# Patient Record
Sex: FEMALE | Race: WHITE | NOT HISPANIC OR LATINO | Employment: UNEMPLOYED | URBAN - NONMETROPOLITAN AREA
[De-identification: names, ages, dates, MRNs, and addresses within clinical notes are randomized per-mention and may not be internally consistent; named-entity substitution may affect disease eponyms.]

---

## 2022-03-08 PROCEDURE — 80048 BASIC METABOLIC PNL TOTAL CA: CPT | Performed by: NURSE PRACTITIONER

## 2022-03-08 PROCEDURE — 87086 URINE CULTURE/COLONY COUNT: CPT | Performed by: NURSE PRACTITIONER

## 2022-03-16 ENCOUNTER — OFFICE VISIT (OUTPATIENT)
Dept: FAMILY MEDICINE CLINIC | Facility: CLINIC | Age: 32
End: 2022-03-16

## 2022-03-16 VITALS
TEMPERATURE: 97.2 F | BODY MASS INDEX: 29.59 KG/M2 | HEIGHT: 62 IN | OXYGEN SATURATION: 99 % | WEIGHT: 160.8 LBS | HEART RATE: 88 BPM | SYSTOLIC BLOOD PRESSURE: 140 MMHG | DIASTOLIC BLOOD PRESSURE: 80 MMHG

## 2022-03-16 DIAGNOSIS — R31.0 GROSS HEMATURIA: ICD-10-CM

## 2022-03-16 DIAGNOSIS — R10.9 ABDOMINAL PAIN, UNSPECIFIED ABDOMINAL LOCATION: ICD-10-CM

## 2022-03-16 DIAGNOSIS — Z87.442 HISTORY OF KIDNEY STONES: ICD-10-CM

## 2022-03-16 DIAGNOSIS — Z00.00 ANNUAL PHYSICAL EXAM: Primary | ICD-10-CM

## 2022-03-16 PROCEDURE — 99385 PREV VISIT NEW AGE 18-39: CPT | Performed by: FAMILY MEDICINE

## 2022-03-16 RX ORDER — IBUPROFEN 200 MG
200 TABLET ORAL EVERY 6 HOURS PRN
COMMUNITY
End: 2022-06-20

## 2022-03-17 ENCOUNTER — PATIENT ROUNDING (BHMG ONLY) (OUTPATIENT)
Dept: FAMILY MEDICINE CLINIC | Facility: CLINIC | Age: 32
End: 2022-03-17

## 2022-03-17 NOTE — PROGRESS NOTES
March 17, 2022    Hello, may I speak with Jessy Melchor?    My name is Cadence    I am  with [facility]  [address] [address] [phone].    Before we get started may I verify your date of birth? 1990    I am calling to officially welcome you to our practice and ask about your recent visit. Is this a good time to talk? yes    Tell me about your visit with us. What things went well?  provider was excellent and staff was great       We're always looking for ways to make our patients' experiences even better. Do you have recommendations on ways we may improve?  no    Overall were you satisfied with your first visit to our practice? yes       I appreciate you taking the time to speak with me today. Is there anything else I can do for you? no      Thank you, and have a great day.

## 2022-04-01 ENCOUNTER — OFFICE VISIT (OUTPATIENT)
Dept: OBSTETRICS AND GYNECOLOGY | Facility: CLINIC | Age: 32
End: 2022-04-01

## 2022-04-01 VITALS
BODY MASS INDEX: 29.63 KG/M2 | HEIGHT: 62 IN | DIASTOLIC BLOOD PRESSURE: 94 MMHG | SYSTOLIC BLOOD PRESSURE: 140 MMHG | WEIGHT: 161 LBS

## 2022-04-01 DIAGNOSIS — Z87.442 HISTORY OF KIDNEY STONES: ICD-10-CM

## 2022-04-01 DIAGNOSIS — Z01.419 WELL WOMAN EXAM WITH ROUTINE GYNECOLOGICAL EXAM: Primary | ICD-10-CM

## 2022-04-01 DIAGNOSIS — E53.8 VITAMIN B 12 DEFICIENCY: ICD-10-CM

## 2022-04-01 DIAGNOSIS — E55.9 VITAMIN D DEFICIENCY: ICD-10-CM

## 2022-04-01 PROCEDURE — 99395 PREV VISIT EST AGE 18-39: CPT | Performed by: NURSE PRACTITIONER

## 2022-04-01 PROCEDURE — G0123 SCREEN CERV/VAG THIN LAYER: HCPCS | Performed by: NURSE PRACTITIONER

## 2022-04-01 PROCEDURE — 87624 HPV HI-RISK TYP POOLED RSLT: CPT | Performed by: NURSE PRACTITIONER

## 2022-04-01 NOTE — PATIENT INSTRUCTIONS
Health Maintenance, Female  Adopting a healthy lifestyle and getting preventive care are important in promoting health and wellness. Ask your health care provider about:  The right schedule for you to have regular tests and exams.  Things you can do on your own to prevent diseases and keep yourself healthy.  What should I know about diet, weight, and exercise?  Eat a healthy diet    Eat a diet that includes plenty of vegetables, fruits, low-fat dairy products, and lean protein.  Do not eat a lot of foods that are high in solid fats, added sugars, or sodium.    Maintain a healthy weight  Body mass index (BMI) is used to identify weight problems. It estimates body fat based on height and weight. Your health care provider can help determine your BMI and help you achieve or maintain a healthy weight.  Get regular exercise  Get regular exercise. This is one of the most important things you can do for your health. Most adults should:  Exercise for at least 150 minutes each week. The exercise should increase your heart rate and make you sweat (moderate-intensity exercise).  Do strengthening exercises at least twice a week. This is in addition to the moderate-intensity exercise.  Spend less time sitting. Even light physical activity can be beneficial.  Start  Vitamin D 5000IU per day. Vitamin D has been shown to improve your mood, help with fatigue and increase your immune system. A normal Vitamin D in women should be 50-70.  You should have your Vitamin D checked yearly  Watch cholesterol and blood lipids  Have your blood tested for lipids and cholesterol at 20 years of age, then have this test every 3 years.  Have your cholesterol levels checked more often if:  Your lipid or cholesterol levels are high.  You are older than 30 years of age.  You are at high risk for heart disease.  What should I know about cancer screening?  Depending on your health history and family history, you may need to have cancer screening at  various ages. This may include screening for:  Breast cancer.  Cervical cancer.  Colorectal cancer.  Skin cancer.  Lung cancer.  What should I know about heart disease, diabetes, and high blood pressure?  Blood pressure and heart disease  High blood pressure causes heart disease and increases the risk of stroke. This is more likely to develop in people who have high blood pressure readings, are of  descent, or are overweight.  Have your blood pressure checked:                  Every year.  Diabetes  Have regular diabetes screenings. This checks your fasting blood sugar level. Have the screening done:  Once every year after age 30 if you are at a normal weight and have a low risk for diabetes.  More often and at a younger age if you are overweight or have a high risk for diabetes.  What should I know about preventing infection?  Hepatitis B  If you have a higher risk for hepatitis B, you should be screened for this virus. Talk with your health care provider to find out if you are at risk for hepatitis B infection.  Hepatitis C  Testing is recommended for:  Everyone born from 1945 through 1965.  Anyone with known risk factors for hepatitis C.  Sexually transmitted infections (STIs)  Get screened for STIs, including gonorrhea and chlamydia, if:  You are sexually active and are younger than 24 years of age.  You are older than 24 years of age and your health care provider tells you that you are at risk for this type of infection.  Your sexual activity has changed since you were last screened, and you are at increased risk for chlamydia or gonorrhea. Ask your health care provider if you are at risk.  Ask your health care provider about whether you are at high risk for HIV. Your health care provider may recommend a prescription medicine to help prevent HIV infection. If you choose to take medicine to prevent HIV, you should first get tested for HIV. You should then be tested every 3 months for as long as you are  taking the medicine.  Pregnancy  If you are about to stop having your period (premenopausal) and you may become pregnant, seek counseling before you get pregnant.  Take 400 to 800 micrograms (mcg) of folic acid every day if you become pregnant.  Ask for birth control (contraception) if you want to prevent pregnancy.  Osteoporosis and menopause  Osteoporosis is a disease in which the bones lose minerals and strength with aging. This can result in bone fractures. If you are 55 years old or older, or if you are at risk for osteoporosis and fractures, ask your health care provider if you should:  Be screened for bone loss.  Take a calcium or vitamin D supplement to lower your risk of fractures.  Be given hormone replacement therapy (HRT) to treat symptoms of menopause.  Follow these instructions at home:  Lifestyle  Do not use any products that contain nicotine or tobacco, such as cigarettes, e-cigarettes, and chewing tobacco. If you need help quitting, ask your health care provider.  Do not use street drugs.  Do not share needles.  Ask your health care provider for help if you need support or information about quitting drugs.  Alcohol use  Do not drink alcohol if:  Your health care provider tells you not to drink.  You are pregnant, may be pregnant, or are planning to become pregnant.  If you drink alcohol:  Limit how much you use to 0-1 drink a day.  Limit intake if you are breastfeeding.  Be aware of how much alcohol is in your drink. In the [place] one drink equals one 12 oz bottle of beer (355 mL), one 5 oz glass of wine (148 mL), or one 1½ oz glass of hard liquor (44 mL).  General instructions  Schedule regular health, dental, and eye exams.  Stay current with your vaccines.  Tell your health care provider if:  You often feel depressed.  You have ever been abused or do not feel safe at home.  Summary  Adopting a healthy lifestyle and getting preventive care are important in promoting health and wellness.  Follow  your health care provider's instructions about healthy diet, exercising, and getting tested or screened for diseases.  Follow your health care provider's instructions on monitoring your cholesterol and blood pressure.  This information is not intended to replace advice given to you by your health care provider. Make sure you discuss any questions you have with your health care provider.  Document Revised: 12/11/2019 Document Reviewed: 12/11/2019  [employer] Patient Education © 2021 [employer] Kegel Exercises    Kegel exercises can help strengthen your pelvic floor muscles. The pelvic floor is a group of muscles that support your rectum, small intestine, and bladder. In females, pelvic floor muscles also help support the womb (uterus). These muscles help you control the flow of urine and stool.  Kegel exercises are painless and simple, and they do not require any equipment. Your provider may suggest Kegel exercises to:  Improve bladder and bowel control.  Improve sexual response.  Improve weak pelvic floor muscles after surgery to remove the uterus (hysterectomy) or pregnancy (females).  Improve weak pelvic floor muscles after prostate gland removal or surgery (males).  Kegel exercises involve squeezing your pelvic floor muscles, which are the same muscles you squeeze when you try to stop the flow of urine or keep from passing gas. The exercises can be done while sitting, standing, or lying down, but it is best to vary your position.  Exercises  How to do Kegel exercises:  Squeeze your pelvic floor muscles tight. You should feel a tight lift in your rectal area. If you are a female, you should also feel a tightness in your vaginal area. Keep your stomach, buttocks, and legs relaxed.  Hold the muscles tight for up to 10 seconds.  Breathe normally.  Relax your muscles.  Repeat as told by your health care provider.  Repeat this exercise daily as told by your health care provider. Continue to do this exercise for at  least 4-6 weeks, or for as long as told by your health care provider.  You may be referred to a physical therapist who can help you learn more about how to do Kegel exercises.  Depending on your condition, your health care provider may recommend:  Varying how long you squeeze your muscles.  Doing several sets of exercises every day.  Doing exercises for several weeks.  Making Kegel exercises a part of your regular exercise routine.  This information is not intended to replace advice given to you by your health care provider. Make sure you discuss any questions you have with your health care provider.  Document Revised: 04/23/2021 Document Reviewed: 08/07/2019  [employer] Patient Education © 2021 [employer]

## 2022-04-01 NOTE — PROGRESS NOTES
"Subjective     Jessy Melchor is a 31 y.o. female    Patient is here today as a new patient for yearly checkup.  She is recently had a kidney stone and was able to pass it.  She has a follow-up appointment with urology.  She is still breast-feeding.    Gynecologic Exam  The patient's pertinent negatives include no pelvic pain, vaginal bleeding or vaginal discharge. The patient is experiencing no pain. Pertinent negatives include no abdominal pain, anorexia, back pain, chills, constipation, diarrhea, discolored urine, dysuria, fever, flank pain, frequency, headaches, hematuria, joint pain, joint swelling, nausea, painful intercourse, rash, sore throat, urgency or vomiting. She is sexually active. She uses the rhythm method for contraception. Her menstrual history has been regular.         /94   Ht 157.5 cm (62.01\")   Wt 73 kg (161 lb)   LMP 03/03/2022   BMI 29.44 kg/m²     Outpatient Encounter Medications as of 4/1/2022   Medication Sig Dispense Refill   • ibuprofen (ADVIL,MOTRIN) 200 MG tablet Take 200 mg by mouth Every 6 (Six) Hours As Needed for Mild Pain .     • Prenatal Vit-Fe Fumarate-FA (PRENATAL VITAMIN PO) Take  by mouth.       No facility-administered encounter medications on file as of 4/1/2022.       Surgical History  Past Surgical History:   Procedure Laterality Date   • WISDOM TOOTH EXTRACTION         Family History  Family History   Problem Relation Age of Onset   • Hypertension Father    • No Known Problems Mother    • No Known Problems Sister    • No Known Problems Sister    • Arthritis Paternal Grandfather    • Melanoma Paternal Grandmother 70   • Diabetes Maternal Aunt    • Breast cancer Neg Hx    • Ovarian cancer Neg Hx    • Uterine cancer Neg Hx    • Colon cancer Neg Hx    • Prostate cancer Neg Hx        The following portions of the patient's history were reviewed and updated as appropriate: allergies, current medications, past family history, past medical history, past social " history, past surgical history, and problem list.    Review of Systems   Constitutional: Negative for activity change, appetite change, chills, diaphoresis, fatigue, fever, unexpected weight gain and unexpected weight loss.   HENT: Negative for congestion, dental problem, drooling, ear discharge, ear pain, facial swelling, hearing loss, mouth sores, nosebleeds, postnasal drip, rhinorrhea, sinus pressure, sneezing, sore throat, swollen glands, tinnitus, trouble swallowing and voice change.    Eyes: Negative for blurred vision, double vision, photophobia, pain, discharge, redness, itching and visual disturbance.   Respiratory: Negative for apnea, cough, choking, chest tightness, shortness of breath, wheezing and stridor.    Cardiovascular: Negative for chest pain, palpitations and leg swelling.   Gastrointestinal: Negative for abdominal distention, abdominal pain, anal bleeding, anorexia, blood in stool, constipation, diarrhea, nausea, rectal pain, vomiting, GERD and indigestion.   Endocrine: Negative for cold intolerance, heat intolerance, polydipsia, polyphagia and polyuria.   Genitourinary: Negative for amenorrhea, breast discharge, breast lump, breast pain, decreased libido, decreased urine volume, difficulty urinating, dyspareunia, dysuria, flank pain, frequency, genital sores, hematuria, menstrual problem, pelvic pain, pelvic pressure, urgency, urinary incontinence, vaginal bleeding, vaginal discharge and vaginal pain.   Musculoskeletal: Negative for arthralgias, back pain, gait problem, joint pain, joint swelling, myalgias, neck pain, neck stiffness and bursitis.   Skin: Negative for color change, dry skin and rash.   Allergic/Immunologic: Negative for environmental allergies, food allergies and immunocompromised state.   Neurological: Negative for dizziness, tremors, seizures, syncope, facial asymmetry, speech difficulty, weakness, light-headedness, numbness, headache, memory problem and confusion.    Hematological: Negative for adenopathy. Does not bruise/bleed easily.   Psychiatric/Behavioral: Negative for agitation, behavioral problems, decreased concentration, dysphoric mood, hallucinations, self-injury, sleep disturbance, suicidal ideas, negative for hyperactivity, depressed mood and stress. The patient is not nervous/anxious.        Objective   Physical Exam  Vitals and nursing note reviewed. Exam conducted with a chaperone present.   Constitutional:       General: She is not in acute distress.     Appearance: She is well-developed. She is not diaphoretic.   HENT:      Head: Normocephalic.      Right Ear: External ear normal.      Left Ear: External ear normal.      Nose: Nose normal.   Eyes:      General: No scleral icterus.        Right eye: No discharge.         Left eye: No discharge.      Conjunctiva/sclera: Conjunctivae normal.      Pupils: Pupils are equal, round, and reactive to light.   Neck:      Thyroid: No thyromegaly.      Vascular: No carotid bruit.      Trachea: No tracheal deviation.   Cardiovascular:      Rate and Rhythm: Normal rate and regular rhythm.      Heart sounds: Normal heart sounds. No murmur heard.  Pulmonary:      Effort: Pulmonary effort is normal. No respiratory distress.      Breath sounds: Normal breath sounds. No wheezing.   Chest:   Breasts: Breasts are symmetrical.      Right: Normal. No swelling, bleeding, inverted nipple, mass, nipple discharge, skin change, tenderness, axillary adenopathy or supraclavicular adenopathy.      Left: Normal. No swelling, bleeding, inverted nipple, mass, nipple discharge, skin change, tenderness, axillary adenopathy or supraclavicular adenopathy.       Abdominal:      General: There is no distension.      Palpations: Abdomen is soft. There is no mass.      Tenderness: There is no abdominal tenderness. There is no right CVA tenderness, left CVA tenderness or guarding.      Hernia: No hernia is present. There is no hernia in the left  inguinal area or right inguinal area.   Genitourinary:     General: Normal vulva.      Exam position: Lithotomy position.      Labia:         Right: No rash, tenderness, lesion or injury.         Left: No rash, tenderness, lesion or injury.       Vagina: Normal. No signs of injury and foreign body. No vaginal discharge, erythema, tenderness or bleeding.      Cervix: Normal.      Uterus: Normal. Not enlarged, not fixed and not tender.       Adnexa: Right adnexa normal and left adnexa normal.        Right: No mass, tenderness or fullness.          Left: No mass, tenderness or fullness.        Rectum: Normal. No mass.      Comments: Mild cystocele noted  BSU normal  Urethral meatus  Normal  Perineum  Normal  Musculoskeletal:         General: No tenderness. Normal range of motion.      Cervical back: Normal range of motion and neck supple.   Lymphadenopathy:      Head:      Right side of head: No submental, submandibular, tonsillar, preauricular, posterior auricular or occipital adenopathy.      Left side of head: No submental, submandibular, tonsillar, preauricular, posterior auricular or occipital adenopathy.      Cervical: No cervical adenopathy.      Right cervical: No superficial, deep or posterior cervical adenopathy.     Left cervical: No superficial, deep or posterior cervical adenopathy.      Upper Body:      Right upper body: No supraclavicular, axillary or pectoral adenopathy.      Left upper body: No supraclavicular, axillary or pectoral adenopathy.      Lower Body: No right inguinal adenopathy. No left inguinal adenopathy.   Skin:     General: Skin is warm and dry.      Findings: No bruising, erythema or rash.   Neurological:      Mental Status: She is alert and oriented to person, place, and time.      Coordination: Coordination normal.   Psychiatric:         Mood and Affect: Mood normal.         Behavior: Behavior normal.         Thought Content: Thought content normal.         Judgment: Judgment normal.          Assessment/Plan   Diagnoses and all orders for this visit:    1. Well woman exam with routine gynecological exam (Primary)  Normal GYN exam. Will have lab work. Encouraged SBE, pt is aware how to do self breast exam and the importance of same. Discussed weight management and importance of maintaining a healthy weight. Discussed Vitamin D intake and the importance of adequate vitamin D for both Bone Health and a healthy immune system.  Discussed Daily exercise and the importance of same, in regards to a healthy heart as well as helping to maintain her weight and improving her mental health.  BMI 29.4.  Pap smear is done per [facility]P guidelines.  -     Liquid-based Pap Smear, Screening  -     CBC & Differential  -     Comprehensive Metabolic Panel  -     Lipid Panel With LDL / HDL Ratio  -     TSH  -     T3, Uptake  -     T4, Free  -     Hemoglobin A1c  -     Urine Culture - , Urine, Clean Catch  -     UA / M With / Rflx Culture([facility] ONLY) - Urine, Clean Catch  -     Hepatitis C Antibody    2. Mother currently breast-feeding  Comments:  Patient's baby is 1-year-old.  She plans on nursing for another year.  She will continue her prenatal vitamins.    3. History of kidney stones  Comments:  Patient has history of kidney stones.  She has an appointment with urology for follow-up within the next month.    4. Vitamin D deficiency  Comments:  Patient will return for blood work.  Orders:  -     Vitamin D 25 Hydroxy    5. Vitamin B 12 deficiency  Comments:  Patient will return for blood work.    6.  Mild cystocele  Patient is encouraged to do Kegel exercises.    Patient's Body mass index is 29.44 kg/m². indicating that she is overweight (BMI 25-29.9). Patient's (Body mass index is 29.44 kg/m².) indicates that they are overweight with health conditions that include none . Weight is unchanged. BMI is is above average; BMI management plan is completed. We discussed portion control and increasing exercise. .      Ashley CUETO  Jillian, APRN  4/1/2022

## 2022-04-04 ENCOUNTER — HOSPITAL ENCOUNTER (OUTPATIENT)
Dept: GENERAL RADIOLOGY | Facility: HOSPITAL | Age: 32
Discharge: HOME | End: 2022-04-04
Admitting: FAMILY MEDICINE

## 2022-04-04 DIAGNOSIS — Z87.442 HISTORY OF KIDNEY STONES: ICD-10-CM

## 2022-04-04 DIAGNOSIS — R10.9 ABDOMINAL PAIN, UNSPECIFIED ABDOMINAL LOCATION: ICD-10-CM

## 2022-04-04 DIAGNOSIS — R31.0 GROSS HEMATURIA: ICD-10-CM

## 2022-04-04 PROCEDURE — 74018 RADEX ABDOMEN 1 VIEW: CPT

## 2022-04-05 LAB
GEN CATEG CVX/VAG CYTO-IMP: NORMAL
HPV I/H RISK 4 DNA CVX QL PROBE+SIG AMP: NOT DETECTED
LAB AP CASE REPORT: NORMAL
LAB AP GYN ADDITIONAL INFORMATION: NORMAL
LAB AP GYN OTHER FINDINGS: NORMAL
PATH INTERP SPEC-IMP: NORMAL
STAT OF ADQ CVX/VAG CYTO-IMP: NORMAL

## 2022-04-05 NOTE — PROGRESS NOTES
Subjective    Ms. Melchor is 31 y.o. female    Chief Complaint: Gross Hematuria    History of Present Illness  31-year-old female new patient with history of kidney stones reports intermittent flank pain, most recently on Monday she had severe sudden onset right-sided flank pain just like her previous stone pain.  She has also had intermittent gross hematuria for several weeks.  She reports her last kidney stone was around 2014 in [place].  KUB x-ray done today reviewed by me with the patient shows 3 to 4 mm left lower pole kidney stone, possible calcification along distal aspect of right mid ureter overlying the sacrum consistent with possible right ureteral stone, no discrete right kidney stone visible.  UA with microhematuria today.  She denies fevers, dysuria.    I independently visualized and reviewed the patient's prior imaging studies today in clinic and discussed the imaging findings with the patient.      The following portions of the patient's history were reviewed and updated as appropriate: allergies, current medications, past family history, past medical history, past social history, past surgical history and problem list.    Review of Systems      Current Outpatient Medications:   •  ibuprofen (ADVIL,MOTRIN) 200 MG tablet, Take 200 mg by mouth Every 6 (Six) Hours As Needed for Mild Pain ., Disp: , Rfl:   •  Prenatal Vit-Fe Fumarate-FA (PRENATAL VITAMIN PO), Take  by mouth., Disp: , Rfl:   •  ondansetron ODT (Zofran ODT) 4 MG disintegrating tablet, Place 1 tablet on the tongue Every 6 (Six) Hours As Needed for Nausea or Vomiting., Disp: 10 tablet, Rfl: 1    Past Medical History:   Diagnosis Date   • Kidney stone        Past Surgical History:   Procedure Laterality Date   • WISDOM TOOTH EXTRACTION         Social History     Socioeconomic History   • Marital status:    Tobacco Use   • Smoking status: Never Smoker   • Smokeless tobacco: Never Used   Vaping Use   • Vaping Use: Never used   Substance  "and Sexual Activity   • Alcohol use: Yes     Comment: occ   • Drug use: Never   • Sexual activity: Yes     Partners: Male     Birth control/protection: None       Family History   Problem Relation Age of Onset   • Hypertension Father    • No Known Problems Mother    • No Known Problems Sister    • No Known Problems Sister    • Arthritis Paternal Grandfather    • Melanoma Paternal Grandmother 70   • Diabetes Maternal Aunt    • Breast cancer Neg Hx    • Ovarian cancer Neg Hx    • Uterine cancer Neg Hx    • Colon cancer Neg Hx    • Prostate cancer Neg Hx        Objective    Temp 97 °F (36.1 °C)   Ht 157.5 cm (62\")   Wt 72.6 kg (160 lb)   BMI 29.26 kg/m²     Physical Exam        Results for orders placed or performed in visit on 04/06/22   POC Urinalysis Dipstick, Multipro    Specimen: Urine   Result Value Ref Range    Color Yellow Yellow, Straw, Dark Yellow, Sydnee    Clarity, UA Clear Clear    Glucose, UA Negative Negative, 1000 mg/dL (3+) mg/dL    Bilirubin Negative Negative    Ketones, UA Negative Negative    Specific Gravity  1.020 1.005 - 1.030    Blood, UA Large (A) Negative    pH, Urine 6.5 5.0 - 8.0    Protein, POC Negative Negative mg/dL    Urobilinogen, UA Normal Normal    Nitrite, UA Negative Negative    Leukocytes Negative Negative     Assessment and Plan    Diagnoses and all orders for this visit:    1. Gross hematuria (Primary)  -     POC Urinalysis Dipstick, Multipro  -     CT Abdomen Pelvis With & Without Contrast; Future    2. Kidney stone  -     ondansetron ODT (Zofran ODT) 4 MG disintegrating tablet; Place 1 tablet on the tongue Every 6 (Six) Hours As Needed for Nausea or Vomiting.  Dispense: 10 tablet; Refill: 1      Intermittent gross hematuria with new right-sided flank pain with history of kidney stones for which I recommended getting a CT urogram for complete upper tract evaluation for kidney stones, hydronephrosis, or renal mass.  She will follow-up with me after getting a CT urogram to " review her imaging.  I instructed her to go to the ER for any fevers and/or severe kidney stone pain sooner as needed.  She will use Zofran for her intermittent nausea.        This document has been signed by MERRY Arellano MD on April 7, 2022 11:44 CDT

## 2022-04-06 ENCOUNTER — OFFICE VISIT (OUTPATIENT)
Dept: UROLOGY | Facility: CLINIC | Age: 32
End: 2022-04-06

## 2022-04-06 VITALS — HEIGHT: 62 IN | TEMPERATURE: 97 F | BODY MASS INDEX: 29.44 KG/M2 | WEIGHT: 160 LBS

## 2022-04-06 DIAGNOSIS — R31.0 GROSS HEMATURIA: Primary | ICD-10-CM

## 2022-04-06 DIAGNOSIS — N20.0 KIDNEY STONE: ICD-10-CM

## 2022-04-06 LAB
BILIRUB BLD-MCNC: NEGATIVE MG/DL
CLARITY, POC: CLEAR
COLOR UR: YELLOW
GLUCOSE UR STRIP-MCNC: NEGATIVE MG/DL
KETONES UR QL: NEGATIVE
LEUKOCYTE EST, POC: NEGATIVE
NITRITE UR-MCNC: NEGATIVE MG/ML
PH UR: 6.5 [PH] (ref 5–8)
PROT UR STRIP-MCNC: NEGATIVE MG/DL
RBC # UR STRIP: ABNORMAL /UL
SP GR UR: 1.02 (ref 1–1.03)
UROBILINOGEN UR QL: NORMAL

## 2022-04-06 PROCEDURE — 81001 URINALYSIS AUTO W/SCOPE: CPT | Performed by: UROLOGY

## 2022-04-06 PROCEDURE — 99204 OFFICE O/P NEW MOD 45 MIN: CPT | Performed by: UROLOGY

## 2022-04-06 RX ORDER — ONDANSETRON 4 MG/1
4 TABLET, ORALLY DISINTEGRATING ORAL EVERY 6 HOURS PRN
Qty: 10 TABLET | Refills: 1 | Status: SHIPPED | OUTPATIENT
Start: 2022-04-06 | End: 2022-06-20

## 2022-04-06 NOTE — PROGRESS NOTES
Subjective    Ms. Melchor is 31 y.o. female    Chief Complaint: Gross Hematuria    History of Present Illness  31-year-old female follow-up to review her CT scan done for history of gross hematuria and right flank pain.  Her CT scan today was done without contrast due to breast-feeding and I reviewed her scan independently and with her today showing a 5 mm right distal ureteral stone along with an adjacent 2 to 3 mm stone with mild hydroureteronephrosis and punctate nephrocalcinosis bilaterally.    I independently visualized and reviewed the patient's prior imaging studies today in clinic and discussed the imaging findings with the patient.      The following portions of the patient's history were reviewed and updated as appropriate: allergies, current medications, past family history, past medical history, past social history, past surgical history and problem list.    Review of Systems      Current Outpatient Medications:   •  ibuprofen (ADVIL,MOTRIN) 200 MG tablet, Take 200 mg by mouth Every 6 (Six) Hours As Needed for Mild Pain ., Disp: , Rfl:   •  ondansetron ODT (Zofran ODT) 4 MG disintegrating tablet, Place 1 tablet on the tongue Every 6 (Six) Hours As Needed for Nausea or Vomiting., Disp: 10 tablet, Rfl: 1  •  Prenatal Vit-Fe Fumarate-FA (PRENATAL VITAMIN PO), Take  by mouth., Disp: , Rfl:     Past Medical History:   Diagnosis Date   • Kidney stone        Past Surgical History:   Procedure Laterality Date   • WISDOM TOOTH EXTRACTION         Social History     Socioeconomic History   • Marital status:    Tobacco Use   • Smoking status: Never Smoker   • Smokeless tobacco: Never Used   Vaping Use   • Vaping Use: Never used   Substance and Sexual Activity   • Alcohol use: Yes     Comment: occ   • Drug use: Never   • Sexual activity: Yes     Partners: Male     Birth control/protection: None       Family History   Problem Relation Age of Onset   • Hypertension Father    • No Known Problems Mother    • No  "Known Problems Sister    • No Known Problems Sister    • Arthritis Paternal Grandfather    • Melanoma Paternal Grandmother 70   • Diabetes Maternal Aunt    • Breast cancer Neg Hx    • Ovarian cancer Neg Hx    • Uterine cancer Neg Hx    • Colon cancer Neg Hx    • Prostate cancer Neg Hx        Objective    Temp 97.8 °F (36.6 °C)   Ht 157.5 cm (62\")   Wt 72.6 kg (160 lb)   BMI 29.26 kg/m²     Physical Exam        Results for orders placed or performed in visit on 04/12/22   POC Urinalysis Dipstick, Multipro    Specimen: Urine   Result Value Ref Range    Color Yellow Yellow, Straw, Dark Yellow, Sydnee    Clarity, UA Clear Clear    Glucose, UA Negative Negative, 1000 mg/dL (3+) mg/dL    Bilirubin Negative Negative    Ketones, UA Negative Negative    Specific Gravity  1.015 1.005 - 1.030    Blood, UA Negative Negative    pH, Urine 7.0 5.0 - 8.0    Protein, POC Negative Negative mg/dL    Urobilinogen, UA Normal Normal    Nitrite, UA Negative Negative    Leukocytes Negative Negative     Assessment and Plan    Diagnoses and all orders for this visit:    1. Right ureteral stone (Primary)  -     POC Urinalysis Dipstick, Multipro  -     Case Request; Standing  -     COVID PRE-OP / PRE-PROCEDURE SCREENING ORDER (NO ISOLATION) - Swab, Nasopharynx; Future  -     CBC (No Diff); Future  -     Basic Metabolic Panel; Future  -     Case Request    Other orders  -     Follow Anesthesia Guidelines / Standing Orders; Future  -     Obtain Informed Consent  -     Provide NPO Instructions to Patient; Future  -     Chlorhexidine Skin Prep; Future        Symptomatic 5 to 7 mm right distal ureteral stone.  We discussed management options including medical expulsive therapy, shockwave lithotripsy, or right ureteroscopy laser lithotripsy stent placement.  Given its position in the pelvis and difficulty visualizing on KUB x-ray last visit along with her young age, I did not recommend shock with lithotripsy.  I recommended scheduling right " ureteroscopy for laser lithotripsy and stent placement.  We discussed risks of the procedure including but not limited to infection, bleeding, need for additional procedures, injury to the ureter and/or kidney, inability to remove any/all stone, need for ureteral stent, complications of anesthesia.  She voices understanding and provide informed said to proceed with right ureteroscopy laser lithotripsy and stent placement on 4/22/2022.        This document has been signed by MERRY Arellano MD on April 12, 2022 13:00 CDT

## 2022-04-12 ENCOUNTER — OFFICE VISIT (OUTPATIENT)
Dept: UROLOGY | Facility: CLINIC | Age: 32
End: 2022-04-12

## 2022-04-12 ENCOUNTER — HOSPITAL ENCOUNTER (OUTPATIENT)
Dept: CT IMAGING | Facility: HOSPITAL | Age: 32
Discharge: HOME | End: 2022-04-12
Admitting: UROLOGY

## 2022-04-12 VITALS — HEIGHT: 62 IN | BODY MASS INDEX: 29.44 KG/M2 | TEMPERATURE: 97.8 F | WEIGHT: 160 LBS

## 2022-04-12 DIAGNOSIS — N20.1 RIGHT URETERAL STONE: Primary | ICD-10-CM

## 2022-04-12 DIAGNOSIS — R31.0 GROSS HEMATURIA: ICD-10-CM

## 2022-04-12 LAB
BILIRUB BLD-MCNC: NEGATIVE MG/DL
CLARITY, POC: CLEAR
COLOR UR: YELLOW
GLUCOSE UR STRIP-MCNC: NEGATIVE MG/DL
KETONES UR QL: NEGATIVE
LEUKOCYTE EST, POC: NEGATIVE
NITRITE UR-MCNC: NEGATIVE MG/ML
PH UR: 7 [PH] (ref 5–8)
PROT UR STRIP-MCNC: NEGATIVE MG/DL
RBC # UR STRIP: NEGATIVE /UL
SP GR UR: 1.01 (ref 1–1.03)
UROBILINOGEN UR QL: NORMAL

## 2022-04-12 PROCEDURE — 99213 OFFICE O/P EST LOW 20 MIN: CPT | Performed by: UROLOGY

## 2022-04-12 PROCEDURE — 81001 URINALYSIS AUTO W/SCOPE: CPT | Performed by: UROLOGY

## 2022-04-12 PROCEDURE — 74176 CT ABD & PELVIS W/O CONTRAST: CPT

## 2022-04-12 RX ORDER — SODIUM CHLORIDE, SODIUM LACTATE, POTASSIUM CHLORIDE, CALCIUM CHLORIDE 600; 310; 30; 20 MG/100ML; MG/100ML; MG/100ML; MG/100ML
100 INJECTION, SOLUTION INTRAVENOUS CONTINUOUS
Status: CANCELLED | OUTPATIENT
Start: 2022-04-12

## 2022-04-19 ENCOUNTER — LAB (OUTPATIENT)
Dept: LAB | Facility: HOSPITAL | Age: 32
End: 2022-04-19

## 2022-04-19 ENCOUNTER — PRE-ADMISSION TESTING (OUTPATIENT)
Dept: PREADMISSION TESTING | Facility: HOSPITAL | Age: 32
End: 2022-04-19

## 2022-04-19 VITALS
HEART RATE: 87 BPM | SYSTOLIC BLOOD PRESSURE: 137 MMHG | RESPIRATION RATE: 14 BRPM | DIASTOLIC BLOOD PRESSURE: 82 MMHG | WEIGHT: 160.27 LBS | HEIGHT: 63 IN | OXYGEN SATURATION: 100 % | BODY MASS INDEX: 28.4 KG/M2

## 2022-04-19 DIAGNOSIS — N20.1 RIGHT URETERAL STONE: ICD-10-CM

## 2022-04-19 LAB — SARS-COV-2 ORF1AB RESP QL NAA+PROBE: NOT DETECTED

## 2022-04-19 PROCEDURE — 85027 COMPLETE CBC AUTOMATED: CPT | Performed by: UROLOGY

## 2022-04-19 PROCEDURE — 80048 BASIC METABOLIC PNL TOTAL CA: CPT | Performed by: UROLOGY

## 2022-04-19 PROCEDURE — U0004 COV-19 TEST NON-CDC HGH THRU: HCPCS

## 2022-04-19 PROCEDURE — C9803 HOPD COVID-19 SPEC COLLECT: HCPCS

## 2022-04-19 NOTE — DISCHARGE INSTRUCTIONS
Before you come to the hospital        Arrival time: AS DIRECTED BY OFFICE     YOU MAY TAKE THE FOLLOWING MEDICATION(S) THE MORNING OF SURGERY WITH A SIP OF WATER: ***     ALL OTHER HOME MEDICATION CHECK WITH YOUR PHYSICIAN (especially if you are taking diabetes medicines or blood thinners)    Do not take any Erectile Dysfunction medications (EX: CIALIS, VIAGRA) 24 hours prior to surgery      If you were given and instructed to use a germ- killing soap, use as directed the night before surgery and the morning of surgery before coming to the hospital.       Eating and drinking restrictions  (It is extremely important that you follow these guidelines to prevent delay or cancelation of their procedure)   Up to 2 hours prior to the time you are told to arrive to the hospital - you may continue to drink clear liquids, such as water, clear fruit juice (no pulp), black coffee, and plain tea.          Eating and drinking restrictions prior to scheduled arrival time  Clear liquids (water, apple juice-no pulp)                       2 hours   Breast milk                           4 hours   Milk or drinks that contain milk, full liquids           6 hours   Light meals or foods, such as toast or cereal                6 hours   Heavy foods, such as meat, fried foods or fatty foods   8 hours       Clear Liquids  Water and flavored water                                                                       Sugar water.  Ice or frozen ice pops.  Clear Fruit juices, such as cranberry juice and apple juice.  Black coffee.  Plain tea  Clear bouillon or broth.  Broth-based soups that have been strained.  Flavored gelatin.  Soda.  Gatorade or Powerade.  Full liquid examples  Juices that have pulp.  Frozen ice pops that contain fruit pieces.  Coffee with creamer  Milk.  Cream or cream-based soups.  Yogurt.              MANAGING PAIN AFTER SURGERY    We know you are probably wondering what your pain will be like after surgery.  Following  surgery it is unrealistic to expect you will not have pain.   Pain is how our bodies let us know that something is wrong or cautions us to be careful.  That said, our goal is to make your pain tolerable.    Methods we may use to treat your pain include (oral or IV medications, PCAs, epidurals, nerve blocks, etc.)   While some procedures require IV pain medications for a short time after surgery, transitioning to pain medications by mouth allows for better management of pain.   Your nurse will encourage you to take oral pain medications whenever possible.  IV medications work almost immediately, but only last a short while.  Taking medications by mouth allows for a more constant level of medication in your blood stream for a longer period of time.      Once your pain is out of control it is harder to get back under control.  It is important you are aware when your next dose of pain medication is due.  If you are admitted, your nurse may write the time of your next dose on the white board in your room to help you remember.      We are interested in your pain and encourage you to inform us about aggravating factors during your visit.   Many times a simple repositioning every few hours can make a big difference.    If your physician says it is okay, do not let your pain prevent you from getting out of bed. Be sure to call your nurse for assistance prior to getting up so you do not fall.      Before surgery, please decide your tolerable pain goal.  These faces help describe the pain ratings we use on a 0-10 scale.   Be prepared to tell us your goal and whether or not you take pain or anxiety medications at home.

## 2022-04-20 ENCOUNTER — TELEPHONE (OUTPATIENT)
Dept: UROLOGY | Facility: CLINIC | Age: 32
End: 2022-04-20

## 2022-04-22 ENCOUNTER — APPOINTMENT (OUTPATIENT)
Dept: GENERAL RADIOLOGY | Facility: HOSPITAL | Age: 32
End: 2022-04-22

## 2022-04-22 ENCOUNTER — ANESTHESIA EVENT (OUTPATIENT)
Dept: PERIOP | Facility: HOSPITAL | Age: 32
End: 2022-04-22

## 2022-04-22 ENCOUNTER — ANESTHESIA (OUTPATIENT)
Dept: PERIOP | Facility: HOSPITAL | Age: 32
End: 2022-04-22

## 2022-04-22 ENCOUNTER — HOSPITAL ENCOUNTER (OUTPATIENT)
Facility: HOSPITAL | Age: 32
Setting detail: HOSPITAL OUTPATIENT SURGERY
Discharge: HOME | End: 2022-04-22
Attending: UROLOGY | Admitting: UROLOGY

## 2022-04-22 VITALS
DIASTOLIC BLOOD PRESSURE: 75 MMHG | RESPIRATION RATE: 16 BRPM | TEMPERATURE: 97 F | SYSTOLIC BLOOD PRESSURE: 116 MMHG | OXYGEN SATURATION: 98 % | HEART RATE: 83 BPM

## 2022-04-22 DIAGNOSIS — R31.0 GROSS HEMATURIA: ICD-10-CM

## 2022-04-22 DIAGNOSIS — N20.1 RIGHT URETERAL STONE: Primary | ICD-10-CM

## 2022-04-22 DIAGNOSIS — N20.1 RIGHT URETERAL STONE: ICD-10-CM

## 2022-04-22 LAB — B-HCG UR QL: NEGATIVE

## 2022-04-22 PROCEDURE — 25010000002 PROPOFOL 10 MG/ML EMULSION: Performed by: NURSE ANESTHETIST, CERTIFIED REGISTERED

## 2022-04-22 PROCEDURE — 25010000002 DEXAMETHASONE PER 1 MG: Performed by: ANESTHESIOLOGY

## 2022-04-22 PROCEDURE — 74018 RADEX ABDOMEN 1 VIEW: CPT

## 2022-04-22 PROCEDURE — 25010000002 IOPAMIDOL 61 % SOLUTION: Performed by: UROLOGY

## 2022-04-22 PROCEDURE — 82360 CALCULUS ASSAY QUANT: CPT | Performed by: UROLOGY

## 2022-04-22 PROCEDURE — 81025 URINE PREGNANCY TEST: CPT | Performed by: UROLOGY

## 2022-04-22 PROCEDURE — 74420 UROGRAPHY RTRGR +-KUB: CPT

## 2022-04-22 PROCEDURE — C2617 STENT, NON-COR, TEM W/O DEL: HCPCS | Performed by: UROLOGY

## 2022-04-22 PROCEDURE — C1769 GUIDE WIRE: HCPCS | Performed by: UROLOGY

## 2022-04-22 PROCEDURE — 25010000002 ONDANSETRON PER 1 MG: Performed by: NURSE ANESTHETIST, CERTIFIED REGISTERED

## 2022-04-22 PROCEDURE — 25010000002 KETOROLAC TROMETHAMINE PER 15 MG: Performed by: NURSE ANESTHETIST, CERTIFIED REGISTERED

## 2022-04-22 PROCEDURE — 25010000002 DEXAMETHASONE PER 1 MG: Performed by: NURSE ANESTHETIST, CERTIFIED REGISTERED

## 2022-04-22 PROCEDURE — 25010000002 MIDAZOLAM PER 1 MG: Performed by: ANESTHESIOLOGY

## 2022-04-22 PROCEDURE — 25010000002 FENTANYL CITRATE (PF) 100 MCG/2ML SOLUTION: Performed by: NURSE ANESTHETIST, CERTIFIED REGISTERED

## 2022-04-22 PROCEDURE — 52356 CYSTO/URETERO W/LITHOTRIPSY: CPT | Performed by: UROLOGY

## 2022-04-22 PROCEDURE — 88300 SURGICAL PATH GROSS: CPT | Performed by: UROLOGY

## 2022-04-22 DEVICE — URETERAL STENT
Type: IMPLANTABLE DEVICE | Site: URETER | Status: FUNCTIONAL
Brand: PERCUFLEX™ PLUS

## 2022-04-22 RX ORDER — PROPOFOL 10 MG/ML
VIAL (ML) INTRAVENOUS AS NEEDED
Status: DISCONTINUED | OUTPATIENT
Start: 2022-04-22 | End: 2022-04-22 | Stop reason: SURG

## 2022-04-22 RX ORDER — LIDOCAINE HYDROCHLORIDE 10 MG/ML
0.5 INJECTION, SOLUTION EPIDURAL; INFILTRATION; INTRACAUDAL; PERINEURAL ONCE AS NEEDED
Status: DISCONTINUED | OUTPATIENT
Start: 2022-04-22 | End: 2022-04-22 | Stop reason: HOSPADM

## 2022-04-22 RX ORDER — NALOXONE HCL 0.4 MG/ML
0.4 VIAL (ML) INJECTION AS NEEDED
Status: DISCONTINUED | OUTPATIENT
Start: 2022-04-22 | End: 2022-04-22 | Stop reason: HOSPADM

## 2022-04-22 RX ORDER — ONDANSETRON 4 MG/1
4 TABLET, ORALLY DISINTEGRATING ORAL EVERY 6 HOURS PRN
Qty: 10 TABLET | Refills: 1 | Status: SHIPPED | OUTPATIENT
Start: 2022-04-22 | End: 2022-06-20

## 2022-04-22 RX ORDER — FENTANYL CITRATE 50 UG/ML
25 INJECTION, SOLUTION INTRAMUSCULAR; INTRAVENOUS
Status: DISCONTINUED | OUTPATIENT
Start: 2022-04-22 | End: 2022-04-22 | Stop reason: HOSPADM

## 2022-04-22 RX ORDER — SODIUM CHLORIDE, SODIUM LACTATE, POTASSIUM CHLORIDE, CALCIUM CHLORIDE 600; 310; 30; 20 MG/100ML; MG/100ML; MG/100ML; MG/100ML
100 INJECTION, SOLUTION INTRAVENOUS CONTINUOUS
Status: DISCONTINUED | OUTPATIENT
Start: 2022-04-22 | End: 2022-04-22 | Stop reason: HOSPADM

## 2022-04-22 RX ORDER — ONDANSETRON 2 MG/ML
4 INJECTION INTRAMUSCULAR; INTRAVENOUS ONCE AS NEEDED
Status: DISCONTINUED | OUTPATIENT
Start: 2022-04-22 | End: 2022-04-22 | Stop reason: HOSPADM

## 2022-04-22 RX ORDER — ACETAMINOPHEN 500 MG
1000 TABLET ORAL ONCE
Status: COMPLETED | OUTPATIENT
Start: 2022-04-22 | End: 2022-04-22

## 2022-04-22 RX ORDER — ONDANSETRON 2 MG/ML
INJECTION INTRAMUSCULAR; INTRAVENOUS AS NEEDED
Status: DISCONTINUED | OUTPATIENT
Start: 2022-04-22 | End: 2022-04-22 | Stop reason: SURG

## 2022-04-22 RX ORDER — LIDOCAINE HYDROCHLORIDE 20 MG/ML
INJECTION, SOLUTION EPIDURAL; INFILTRATION; INTRACAUDAL; PERINEURAL AS NEEDED
Status: DISCONTINUED | OUTPATIENT
Start: 2022-04-22 | End: 2022-04-22 | Stop reason: SURG

## 2022-04-22 RX ORDER — KETOROLAC TROMETHAMINE 30 MG/ML
INJECTION, SOLUTION INTRAMUSCULAR; INTRAVENOUS AS NEEDED
Status: DISCONTINUED | OUTPATIENT
Start: 2022-04-22 | End: 2022-04-22 | Stop reason: SURG

## 2022-04-22 RX ORDER — FENTANYL CITRATE 50 UG/ML
INJECTION, SOLUTION INTRAMUSCULAR; INTRAVENOUS AS NEEDED
Status: DISCONTINUED | OUTPATIENT
Start: 2022-04-22 | End: 2022-04-22 | Stop reason: SURG

## 2022-04-22 RX ORDER — DEXAMETHASONE SODIUM PHOSPHATE 4 MG/ML
4 INJECTION, SOLUTION INTRA-ARTICULAR; INTRALESIONAL; INTRAMUSCULAR; INTRAVENOUS; SOFT TISSUE ONCE AS NEEDED
Status: COMPLETED | OUTPATIENT
Start: 2022-04-22 | End: 2022-04-22

## 2022-04-22 RX ORDER — SODIUM CHLORIDE 0.9 % (FLUSH) 0.9 %
3-10 SYRINGE (ML) INJECTION AS NEEDED
Status: DISCONTINUED | OUTPATIENT
Start: 2022-04-22 | End: 2022-04-22 | Stop reason: HOSPADM

## 2022-04-22 RX ORDER — SODIUM CHLORIDE, SODIUM LACTATE, POTASSIUM CHLORIDE, CALCIUM CHLORIDE 600; 310; 30; 20 MG/100ML; MG/100ML; MG/100ML; MG/100ML
1000 INJECTION, SOLUTION INTRAVENOUS CONTINUOUS
Status: DISCONTINUED | OUTPATIENT
Start: 2022-04-22 | End: 2022-04-22 | Stop reason: HOSPADM

## 2022-04-22 RX ORDER — LABETALOL HYDROCHLORIDE 5 MG/ML
5 INJECTION, SOLUTION INTRAVENOUS
Status: DISCONTINUED | OUTPATIENT
Start: 2022-04-22 | End: 2022-04-22 | Stop reason: HOSPADM

## 2022-04-22 RX ORDER — DROPERIDOL 2.5 MG/ML
0.62 INJECTION, SOLUTION INTRAMUSCULAR; INTRAVENOUS ONCE AS NEEDED
Status: DISCONTINUED | OUTPATIENT
Start: 2022-04-22 | End: 2022-04-22 | Stop reason: HOSPADM

## 2022-04-22 RX ORDER — SODIUM CHLORIDE 0.9 % (FLUSH) 0.9 %
3 SYRINGE (ML) INJECTION EVERY 12 HOURS SCHEDULED
Status: DISCONTINUED | OUTPATIENT
Start: 2022-04-22 | End: 2022-04-22 | Stop reason: HOSPADM

## 2022-04-22 RX ORDER — MAGNESIUM HYDROXIDE 1200 MG/15ML
LIQUID ORAL AS NEEDED
Status: DISCONTINUED | OUTPATIENT
Start: 2022-04-22 | End: 2022-04-22 | Stop reason: HOSPADM

## 2022-04-22 RX ORDER — OXYCODONE AND ACETAMINOPHEN 10; 325 MG/1; MG/1
1 TABLET ORAL ONCE AS NEEDED
Status: DISCONTINUED | OUTPATIENT
Start: 2022-04-22 | End: 2022-04-22 | Stop reason: HOSPADM

## 2022-04-22 RX ORDER — FLUMAZENIL 0.1 MG/ML
0.2 INJECTION INTRAVENOUS AS NEEDED
Status: DISCONTINUED | OUTPATIENT
Start: 2022-04-22 | End: 2022-04-22 | Stop reason: HOSPADM

## 2022-04-22 RX ORDER — IBUPROFEN 600 MG/1
600 TABLET ORAL ONCE AS NEEDED
Status: DISCONTINUED | OUTPATIENT
Start: 2022-04-22 | End: 2022-04-22 | Stop reason: HOSPADM

## 2022-04-22 RX ORDER — HYDROCODONE BITARTRATE AND ACETAMINOPHEN 5; 325 MG/1; MG/1
1 TABLET ORAL ONCE AS NEEDED
Status: DISCONTINUED | OUTPATIENT
Start: 2022-04-22 | End: 2022-04-22 | Stop reason: HOSPADM

## 2022-04-22 RX ORDER — DEXAMETHASONE SODIUM PHOSPHATE 4 MG/ML
INJECTION, SOLUTION INTRA-ARTICULAR; INTRALESIONAL; INTRAMUSCULAR; INTRAVENOUS; SOFT TISSUE AS NEEDED
Status: DISCONTINUED | OUTPATIENT
Start: 2022-04-22 | End: 2022-04-22 | Stop reason: SURG

## 2022-04-22 RX ORDER — SODIUM CHLORIDE 0.9 % (FLUSH) 0.9 %
3 SYRINGE (ML) INJECTION AS NEEDED
Status: DISCONTINUED | OUTPATIENT
Start: 2022-04-22 | End: 2022-04-22 | Stop reason: HOSPADM

## 2022-04-22 RX ORDER — MIDAZOLAM HYDROCHLORIDE 1 MG/ML
2 INJECTION INTRAMUSCULAR; INTRAVENOUS
Status: DISCONTINUED | OUTPATIENT
Start: 2022-04-22 | End: 2022-04-22 | Stop reason: HOSPADM

## 2022-04-22 RX ORDER — HYDROCODONE BITARTRATE AND ACETAMINOPHEN 5; 325 MG/1; MG/1
1 TABLET ORAL EVERY 6 HOURS PRN
Qty: 10 TABLET | Refills: 0 | Status: SHIPPED | OUTPATIENT
Start: 2022-04-22 | End: 2022-06-20

## 2022-04-22 RX ADMIN — KETOROLAC TROMETHAMINE 30 MG: 30 INJECTION, SOLUTION INTRAMUSCULAR; INTRAVENOUS at 12:52

## 2022-04-22 RX ADMIN — SODIUM CHLORIDE, POTASSIUM CHLORIDE, SODIUM LACTATE AND CALCIUM CHLORIDE 1000 ML: 600; 310; 30; 20 INJECTION, SOLUTION INTRAVENOUS at 10:47

## 2022-04-22 RX ADMIN — LIDOCAINE HYDROCHLORIDE 100 MG: 20 INJECTION, SOLUTION EPIDURAL; INFILTRATION; INTRACAUDAL at 12:21

## 2022-04-22 RX ADMIN — DEXAMETHASONE SODIUM PHOSPHATE 8 MG: 4 INJECTION, SOLUTION INTRA-ARTICULAR; INTRALESIONAL; INTRAMUSCULAR; INTRAVENOUS; SOFT TISSUE at 12:28

## 2022-04-22 RX ADMIN — CEFAZOLIN SODIUM 2 G: 1 INJECTION, POWDER, FOR SOLUTION INTRAMUSCULAR; INTRAVENOUS at 12:23

## 2022-04-22 RX ADMIN — ACETAMINOPHEN 1000 MG: 500 TABLET, FILM COATED ORAL at 12:03

## 2022-04-22 RX ADMIN — DEXAMETHASONE SODIUM PHOSPHATE 4 MG: 4 INJECTION, SOLUTION INTRA-ARTICULAR; INTRALESIONAL; INTRAMUSCULAR; INTRAVENOUS; SOFT TISSUE at 12:03

## 2022-04-22 RX ADMIN — MIDAZOLAM 2 MG: 1 INJECTION INTRAMUSCULAR; INTRAVENOUS at 12:06

## 2022-04-22 RX ADMIN — ONDANSETRON 4 MG: 2 INJECTION INTRAMUSCULAR; INTRAVENOUS at 12:52

## 2022-04-22 RX ADMIN — PROPOFOL 150 MG: 10 INJECTION, EMULSION INTRAVENOUS at 12:21

## 2022-04-22 RX ADMIN — FENTANYL CITRATE 100 MCG: 50 INJECTION, SOLUTION INTRAMUSCULAR; INTRAVENOUS at 12:19

## 2022-04-22 NOTE — OP NOTE
URETEROSCOPY LASER LITHOTRIPSY WITH STENT INSERTION  Procedure Note    Jessy Melchor  Date of Procedure: 4/22/2022    Pre-op Diagnosis:   Gross hematuria [R31.0]    Post-op Diagnosis:     Post-Op Diagnosis Codes:     * Gross hematuria [R31.0]    Procedure/CPT® Codes:      Procedure(s):  RIGHT URETEROSCOPY LASER LITHOTRIPSY WITH STENT INSERTION    Surgeon(s):  Alistair Arellano MD    Anesthesia: General    Staff:   Circulator: Amy Gomez RN  Scrub Person: Bud Batres  Assistant: Mae Mullen RN    Indications for procedure:  31-year-old female with 5-6 mm right distal ureteral stone failed to pass    Findings:   6 mm right distal ureteral stone completely fragmented and removed.  Stone was not completely impacted but was lodged just proximal to a tight area of the ureter for which right ureteral stent was indicated.  Right retrograde pyelogram via ureteroscope with moderate right hydroureteronephrosis.    Procedure details:  The patient is identified in the preoperative holding room.  The rationale including the benefits alternatives and risks of this procedure were already discussed and informed consent is been obtained.  The patient demonstrates good understanding of this procedure.  Also explained was that a stent is not a permanent structure but will need to be changed periodically or removed to prevent encrustation which could lead to kidney damage.    The patient is taken to the operating room where appropriate anesthesia is given.  Surgical prophylaxis with IV antibiotics included cefazolin 2 grams. Appropriate timeout protocol was observed.  The usual prep and drape with Betadine was done.    A 23 Romansh cystoscope sheath with a 30° lens is inserted. The urethra and bladder neck show no evidence of mass or obstruction., The bladder showed no significant mucosal lesions, glomerulations or masses. and The ureteral orifices are orthotopic in position.    The bladder was examined with the 30  degree lens.  Ureteral orifices were orthotopic. No bladder tumors or stones were visible.  The right ureteral orifice was cannulated with a 0.038 sensor guidewire which was placed into the right kidney under fluoroscopy and maintained is a safety wire.  I then placed a semirigid ureteroscope into the right ureter.  The distal right ureter was significantly narrow just distal to the 6 mm stone which was found lodged in the proximal portion of the right distal ureter.  I then used a 365 µm holmium laser fiber to dust the stone and to smaller pieces all of which were removed with a tipless nitinol stone basket.  Repeat ureteroscopy demonstrated no other remaining stone fragments visible.  The ureteroscope was passed up into the right proximal ureter and I could see no other stone remaining.  There was no evidence of any discrete ureteral injury present.  I performed a retrograde pyelogram via the ureteroscope which demonstrate moderate right hydroureteronephrosis but no ureteral extravasation.  There was some ureteral narrowing on the ureterogram.  Therefore, I felt it was safest to place a right ureteral stent to allow for healing of this area of the ureter.  I removed the ureteroscope under direct vision and backloaded the safety wire into the cystoscope over which I placed a 6 American by 26 cm double-J ureteral stent in the standard fashion.  After curl was obtained in the right kidney, the wire was completely removed and a curl was obtained the bladder.  The string was left on the stent and tied the meatus to facilitate removal in the office next week.  One of the ureteral stone fragments was passed off for stone analysis.  The bladder was drained and the procedure was terminated.  She was awakened from anesthesia and taken to the recovery room in stable condition.      Estimated Blood Loss: <30 mls    Specimens:                Specimens     ID Source Type Tests Collected By Collected At Frozen?    A Ureter, Right  Calculus · TISSUE PATHOLOGY EXAM   Alistair Arellano MD 4/22/22 1243     Description: stone    This specimen was not marked as sent.            Drains: Right 6 [place] by 26 cm double-J ureteral stent with string  Ureteral Drain/Stent Right ureter 6 Fr. (Active)   Site Assessment JOCELINE 04/22/22 1316       Complications: none    Alistair Arellano MD     Date: 4/22/2022  Time: 13:20 CDT

## 2022-04-22 NOTE — ANESTHESIA PREPROCEDURE EVALUATION
Anesthesia Evaluation     Patient summary reviewed   no history of anesthetic complications:  NPO Solid Status: > 8 hours  NPO Liquid Status: > 8 hours           Airway   Mallampati: I  TM distance: >3 FB  Neck ROM: full  No difficulty expected  Dental      Pulmonary - negative pulmonary ROS   Cardiovascular - negative cardio ROS  Exercise tolerance: excellent (>7 METS)        Neuro/Psych- negative ROS  GI/Hepatic/Renal/Endo    (+)   renal disease stones,     Musculoskeletal     Abdominal    Substance History      OB/GYN          Other                        Anesthesia Plan    ASA 1     general     intravenous induction     Anesthetic plan, all risks, benefits, and alternatives have been provided, discussed and informed consent has been obtained with: patient.        CODE STATUS:

## 2022-04-22 NOTE — ANESTHESIA POSTPROCEDURE EVALUATION
Patient: Jessy Melchor    Procedure Summary     Date: 04/22/22 Room / Location:  [facility] /  [facility]    Anesthesia Start: 1219 Anesthesia Stop: 1313    Procedure: RIGHT URETEROSCOPY LASER LITHOTRIPSY WITH STENT INSERTION (Right Ureter) Diagnosis:       Gross hematuria      (Gross hematuria [R31.0])    Surgeons: Alistair Arellano MD Provider: Jennifer Casey CRNA    Anesthesia Type: general ASA Status: 1          Anesthesia Type: general    Vitals  Vitals Value Taken Time   /91 04/22/22 1315   Temp 97 °F (36.1 °C) 04/22/22 1310   Pulse 93 04/22/22 1316   Resp 16 04/22/22 1315   SpO2 97 % 04/22/22 1315   Vitals shown include unvalidated device data.        Post Anesthesia Care and Evaluation    Patient location during evaluation: PACU  Patient participation: complete - patient participated  Level of consciousness: awake and alert  Pain management: adequate  Airway patency: patent  Anesthetic complications: No anesthetic complications    Cardiovascular status: acceptable  Respiratory status: acceptable  Hydration status: acceptable    Comments: Blood pressure 139/62, pulse 86, temperature 97 °F (36.1 °C), temperature source Temporal, resp. rate 16, last menstrual period 04/03/2022, SpO2 99 %, currently breastfeeding.    Pt discharged from PACU based on jacquelyn score >8

## 2022-04-22 NOTE — INTERVAL H&P NOTE
H&P reviewed.  The patient was examined and there are no changes to the H&P 
H&P reviewed.  The patient was examined and there are no changes to the H&P 
yes

## 2022-04-22 NOTE — ANESTHESIA PROCEDURE NOTES
Airway  Urgency: elective    Date/Time: 4/22/2022 12:22 PM  Airway not difficult    General Information and Staff    Patient location during procedure: OR  CRNA: Jennifer Casey CRNA    Indications and Patient Condition  Indications for airway management: airway protection    Preoxygenated: yes  Mask difficulty assessment: 0 - not attempted    Final Airway Details  Final airway type: supraglottic airway      Successful airway: LMA and I-gel  Size 3    Number of attempts at approach: 1  Assessment: lips, teeth, and gum same as pre-op and atraumatic intubation

## 2022-04-28 ENCOUNTER — OFFICE VISIT (OUTPATIENT)
Dept: UROLOGY | Facility: CLINIC | Age: 32
End: 2022-04-28

## 2022-04-28 VITALS — HEIGHT: 63 IN | TEMPERATURE: 97.8 F | WEIGHT: 160 LBS | BODY MASS INDEX: 28.35 KG/M2

## 2022-04-28 DIAGNOSIS — N20.1 RIGHT URETERAL STONE: Primary | ICD-10-CM

## 2022-04-28 PROCEDURE — 99213 OFFICE O/P EST LOW 20 MIN: CPT

## 2022-04-28 NOTE — PROGRESS NOTES
Subjective    Ms. Meclhor is 31 y.o. female    Chief Complaint: Stent removal / Post Op Appointment for right ureteral stone    History of Present Illness     31-year-old female established patient in for postop stent removal following right ureteroscopy with stent placement on 4/22/2022 by Dr. Arellano for a 5 mm distal right ureteral obstructing stone which caused mild hydronephrosis and ureteral dilation.  At present time patient is experiencing all the symptoms of stent placement such as urinary frequency, urgency, burning with urination.  Patient states that she has had blood with each urination since stent was placed.  Patient denies any fever or chills.  Patient has been taking Norco for pain as prescribed.  Patient denies any difficulty with urination.    The following portions of the patient's history were reviewed and updated as appropriate: allergies, current medications, past family history, past medical history, past social history, past surgical history and problem list.    Review of Systems   Constitutional: Negative for chills, fatigue and fever.   Gastrointestinal: Negative for nausea and vomiting.   Genitourinary: Positive for dysuria, flank pain, frequency, hematuria, pelvic pain and urgency. Negative for decreased urine volume and difficulty urinating.         Current Outpatient Medications:   •  HYDROcodone-acetaminophen (NORCO) 5-325 MG per tablet, Take 1 tablet by mouth Every 6 (Six) Hours As Needed for Severe Pain  (postop pain)., Disp: 10 tablet, Rfl: 0  •  ibuprofen (ADVIL,MOTRIN) 200 MG tablet, Take 200 mg by mouth Every 6 (Six) Hours As Needed for Mild Pain ., Disp: , Rfl:   •  ondansetron ODT (Zofran ODT) 4 MG disintegrating tablet, Place 1 tablet on the tongue Every 6 (Six) Hours As Needed for Nausea or Vomiting., Disp: 10 tablet, Rfl: 1  •  ondansetron ODT (Zofran ODT) 4 MG disintegrating tablet, Place 1 tablet on the tongue Every 6 (Six) Hours As Needed for Nausea., Disp: 10 tablet, Rfl:  "1  •  Prenatal Vit-Fe Fumarate-FA (PRENATAL VITAMIN PO), Take  by mouth Daily., Disp: , Rfl:     Past Medical History:   Diagnosis Date   • Kidney stone    • UTI (urinary tract infection)        Past Surgical History:   Procedure Laterality Date   • URETEROSCOPY LASER LITHOTRIPSY WITH STENT INSERTION Right 4/22/2022    Procedure: RIGHT URETEROSCOPY LASER LITHOTRIPSY WITH STENT INSERTION;  Surgeon: Alistair Arellano MD;  Location: [facility];  Service: Urology;  Laterality: Right;   • WISDOM TOOTH EXTRACTION         Social History     Socioeconomic History   • Marital status:    Tobacco Use   • Smoking status: Never Smoker   • Smokeless tobacco: Never Used   Vaping Use   • Vaping Use: Never used   Substance and Sexual Activity   • Alcohol use: Yes     Comment: occ   • Drug use: Never   • Sexual activity: Yes     Partners: Male     Birth control/protection: None       Family History   Problem Relation Age of Onset   • Hypertension Father    • No Known Problems Mother    • No Known Problems Sister    • No Known Problems Sister    • Arthritis Paternal Grandfather    • Melanoma Paternal Grandmother 70   • Diabetes Maternal Aunt    • Breast cancer Neg Hx    • Ovarian cancer Neg Hx    • Uterine cancer Neg Hx    • Colon cancer Neg Hx    • Prostate cancer Neg Hx        Objective    Temp 97.8 °F (36.6 °C)   Ht 159 cm (62.6\")   Wt 72.6 kg (160 lb)   LMP 04/03/2022   BMI 28.71 kg/m²     Physical Exam  Nursing note reviewed.   Constitutional:       General: She is not in acute distress.     Appearance: Normal appearance. She is not ill-appearing.   HENT:      Nose: No congestion.   Abdominal:      Tenderness: There is right CVA tenderness. There is no left CVA tenderness.      Hernia: No hernia is present.   Skin:     General: Skin is warm and dry.   Neurological:      Mental Status: She is alert and oriented to person, place, and time.   Psychiatric:         Mood and Affect: Mood normal.         Behavior: Behavior " "normal.             Results for orders placed or performed during the hospital encounter of 04/22/22   Pregnancy, Urine - Urine, Clean Catch    Specimen: Urine, Clean Catch   Result Value Ref Range    HCG, Urine QL Negative Negative   Tissue Pathology Exam    Specimen: Ureter, Right; Calculus   Result Value Ref Range    Case Report       Surgical Pathology Report                         Case: [id number]                                  Authorizing Provider:  Alistair Arellano MD      Collected:           04/22/2022 12:43 PM          Ordering Location:     [facility] OR  Received:            04/22/2022 02:17 PM          Pathologist:           Juan Wilkerson MD                                                      Specimen:    Ureter, Right, stone                                                                       Final Diagnosis       Right ureteral Stone (gross only):  Renal lithiasis.  The specimen will be sent for x-ray crystal analysis and an addendum report will follow.      Gross Description       1. Ureter, Right.  Specimen #1 is received fresh, labeled with the patient's name, date of birth and medical record number and designated \" right ureteral stone\".  The specimen consists of 1 yellow-brown, irregularly shaped stone fragment(s) measuring 0.2 x 0.1 x 0.1 cm.  The specimen is totally submitted for crystallographic calculus analysis, no histology is performed.             Assessment and Plan    Diagnoses and all orders for this visit:    1. Right ureteral stone (Primary)      31-year-old female established patient in for postop stent removal following right ureteroscopy with stent placement on 4/22/2022 by Dr. Arellano for a 5 mm distal right ureteral obstructing stone which caused mild hydronephrosis and ureteral dilation.     Patient's stent removed at this time per [place] RIVERA.  Patient tolerated without difficulty.  Patient educated that she may still experience some pain over the next " couple days and to continue oral pain medication as needed.    Patient to keep scheduled postop appointment with Dr. Arellano in June.

## 2022-05-10 LAB
LAB AP CASE REPORT: NORMAL
PATH REPORT.FINAL DX SPEC: NORMAL
PATH REPORT.GROSS SPEC: NORMAL

## 2022-06-15 ENCOUNTER — HOSPITAL ENCOUNTER (OUTPATIENT)
Dept: ULTRASOUND IMAGING | Facility: HOSPITAL | Age: 32
Discharge: HOME | End: 2022-06-15

## 2022-06-15 DIAGNOSIS — N20.1 RIGHT URETERAL STONE: ICD-10-CM

## 2022-06-15 NOTE — PROGRESS NOTES
Subjective    Ms. Melchor is 32 y.o. female    Chief Complaint: Right Ureteral Stone    History of Present Illness    32-year-old female established patient follow-up after right ureteroscopy laser lithotripsy on 4/22/2022 for a 5 mm distal right ureteral obstructing stone.  She did have some medullary nephrocalcinosis on her preop CT scan but this was her only stone.  Bilateral renal ultrasound on 6/16/2022 reviewed by me with the patient today shows normal kidneys bilaterally, no stone or hydronephrosis.    I independently visualized and reviewed the patient's prior imaging studies today in clinic and discussed the imaging findings with the patient.      The following portions of the patient's history were reviewed and updated as appropriate: allergies, current medications, past family history, past medical history, past social history, past surgical history and problem list.    Review of Systems    No current outpatient medications on file.    Past Medical History:   Diagnosis Date   • Kidney stone    • UTI (urinary tract infection)        Past Surgical History:   Procedure Laterality Date   • URETEROSCOPY LASER LITHOTRIPSY WITH STENT INSERTION Right 4/22/2022    Procedure: RIGHT URETEROSCOPY LASER LITHOTRIPSY WITH STENT INSERTION;  Surgeon: Alistair Arellano MD;  Location: [facility];  Service: Urology;  Laterality: Right;   • WISDOM TOOTH EXTRACTION         Social History     Socioeconomic History   • Marital status:    Tobacco Use   • Smoking status: Never Smoker   • Smokeless tobacco: Never Used   Vaping Use   • Vaping Use: Never used   Substance and Sexual Activity   • Alcohol use: Yes     Comment: occ   • Drug use: Never   • Sexual activity: Yes     Partners: Male     Birth control/protection: None       Family History   Problem Relation Age of Onset   • Hypertension Father    • No Known Problems Mother    • No Known Problems Sister    • No Known Problems Sister    • Arthritis Paternal Grandfather    •  "Melanoma Paternal Grandmother 70   • Diabetes Maternal Aunt    • Breast cancer Neg Hx    • Ovarian cancer Neg Hx    • Uterine cancer Neg Hx    • Colon cancer Neg Hx    • Prostate cancer Neg Hx        Objective    Temp 97.4 °F (36.3 °C)   Ht 157.5 cm (62\")   Wt 71.6 kg (157 lb 12.8 oz)   BMI 28.86 kg/m²     Physical Exam        Results for orders placed or performed in visit on 06/20/22   POC Urinalysis Dipstick, Multipro    Specimen: Urine   Result Value Ref Range    Color Yellow Yellow, Straw, Dark Yellow, Sydnee    Clarity, UA Clear Clear    Glucose, UA Negative Negative, 1000 mg/dL (3+) mg/dL    Bilirubin Negative Negative    Ketones, UA Negative Negative    Specific Gravity  1.015 1.005 - 1.030    Blood, UA Negative Negative    pH, Urine 7.0 5.0 - 8.0    Protein, POC Negative Negative mg/dL    Urobilinogen, UA Normal Normal    Nitrite, UA Negative Negative    Leukocytes Trace (A) Negative     Assessment and Plan    Diagnoses and all orders for this visit:    1. History of kidney stones (Primary)  -     POC Urinalysis Dipstick, Multipro  -     XR abdomen kub; Future      Doing well appears stone free by ultrasound after right ureteroscopy laser lithotripsy.  She will follow-up in 1 year with pre-clinic KUB or sooner as needed.    We discussed recommendations for reducing future risk of stone formation and/or stone enlargement including increasing fluid intake with a goal of 6 L daily to achieve a urinary output of 2 to 2.5 L daily, low oxalate diet, benefits of dietary citrate, reducing purine intake, and no added salt.           This document has been signed by MERRY Arellano MD on June 21, 2022 16:54 CDT              "

## 2022-06-16 ENCOUNTER — HOSPITAL ENCOUNTER (OUTPATIENT)
Dept: ULTRASOUND IMAGING | Facility: HOSPITAL | Age: 32
Discharge: HOME | End: 2022-06-16
Admitting: UROLOGY

## 2022-06-16 PROCEDURE — 76775 US EXAM ABDO BACK WALL LIM: CPT

## 2022-06-20 ENCOUNTER — OFFICE VISIT (OUTPATIENT)
Dept: UROLOGY | Facility: CLINIC | Age: 32
End: 2022-06-20

## 2022-06-20 VITALS — HEIGHT: 62 IN | WEIGHT: 157.8 LBS | TEMPERATURE: 97.4 F | BODY MASS INDEX: 29.04 KG/M2

## 2022-06-20 DIAGNOSIS — Z87.442 HISTORY OF KIDNEY STONES: Primary | ICD-10-CM

## 2022-06-20 LAB
BILIRUB BLD-MCNC: NEGATIVE MG/DL
CLARITY, POC: CLEAR
COLOR UR: YELLOW
GLUCOSE UR STRIP-MCNC: NEGATIVE MG/DL
KETONES UR QL: NEGATIVE
LEUKOCYTE EST, POC: ABNORMAL
NITRITE UR-MCNC: NEGATIVE MG/ML
PH UR: 7 [PH] (ref 5–8)
PROT UR STRIP-MCNC: NEGATIVE MG/DL
RBC # UR STRIP: NEGATIVE /UL
SP GR UR: 1.01 (ref 1–1.03)
UROBILINOGEN UR QL: NORMAL

## 2022-06-20 PROCEDURE — 99213 OFFICE O/P EST LOW 20 MIN: CPT | Performed by: UROLOGY

## 2023-02-13 ENCOUNTER — OFFICE VISIT (OUTPATIENT)
Dept: OBSTETRICS AND GYNECOLOGY | Facility: CLINIC | Age: 33
End: 2023-02-13
Payer: COMMERCIAL

## 2023-02-13 VITALS
BODY MASS INDEX: 29.81 KG/M2 | SYSTOLIC BLOOD PRESSURE: 120 MMHG | WEIGHT: 162 LBS | DIASTOLIC BLOOD PRESSURE: 70 MMHG | HEIGHT: 62 IN

## 2023-02-13 DIAGNOSIS — N89.8 VAGINAL DISCHARGE: ICD-10-CM

## 2023-02-13 DIAGNOSIS — N93.0 PCB (POST COITAL BLEEDING): Primary | ICD-10-CM

## 2023-02-13 PROCEDURE — 87481 CANDIDA DNA AMP PROBE: CPT | Performed by: NURSE PRACTITIONER

## 2023-02-13 PROCEDURE — 87798 DETECT AGENT NOS DNA AMP: CPT | Performed by: NURSE PRACTITIONER

## 2023-02-13 PROCEDURE — 87563 M. GENITALIUM AMP PROBE: CPT | Performed by: NURSE PRACTITIONER

## 2023-02-13 PROCEDURE — 99213 OFFICE O/P EST LOW 20 MIN: CPT | Performed by: NURSE PRACTITIONER

## 2023-02-13 PROCEDURE — 87512 GARDNER VAG DNA QUANT: CPT | Performed by: NURSE PRACTITIONER

## 2023-02-13 PROCEDURE — 87661 TRICHOMONAS VAGINALIS AMPLIF: CPT | Performed by: NURSE PRACTITIONER

## 2023-02-13 NOTE — PROGRESS NOTES
"Subjective     Jessy Melchor is a 32 y.o. female    History of Present Illness  Patient is here today to discuss postcoital bleeding.  States it is happened several times.  States it is bright red.  She is still breast-feeding but does not have any pain with intercourse and does not feel dry  Vaginal Bleeding  The patient's primary symptoms include vaginal bleeding. The patient's pertinent negatives include no pelvic pain or vaginal discharge. This is a new problem. The current episode started 1 to 4 weeks ago. The patient is experiencing no pain. Pertinent negatives include no abdominal pain, anorexia, back pain, chills, constipation, diarrhea, discolored urine, dysuria, fever, flank pain, frequency, headaches, hematuria, joint pain, joint swelling, nausea, painful intercourse, rash, sore throat, urgency or vomiting. The vaginal discharge was bloody. The vaginal bleeding is spotting. She has not been passing clots. She has not been passing tissue. The symptoms are aggravated by intercourse. She has tried nothing for the symptoms. She is sexually active. Her menstrual history has been regular.         /70   Ht 157.5 cm (62\")   Wt 73.5 kg (162 lb)   BMI 29.63 kg/m²     No outpatient encounter medications on file as of 2/13/2023.     No facility-administered encounter medications on file as of 2/13/2023.       Surgical History  Past Surgical History:   Procedure Laterality Date   • URETEROSCOPY LASER LITHOTRIPSY WITH STENT INSERTION Right 4/22/2022    Procedure: RIGHT URETEROSCOPY LASER LITHOTRIPSY WITH STENT INSERTION;  Surgeon: Alistair Arellano MD;  Location: [facility];  Service: Urology;  Laterality: Right;   • WISDOM TOOTH EXTRACTION         Family History  Family History   Problem Relation Age of Onset   • Hypertension Father    • No Known Problems Mother    • No Known Problems Sister    • No Known Problems Sister    • Arthritis Paternal Grandfather    • Melanoma Paternal Grandmother 70   • " Diabetes Maternal Aunt    • Breast cancer Neg Hx    • Ovarian cancer Neg Hx    • Uterine cancer Neg Hx    • Colon cancer Neg Hx    • Prostate cancer Neg Hx        The following portions of the patient's history were reviewed and updated as appropriate: allergies, current medications, past family history, past medical history, past social history, past surgical history, and problem list.    Review of Systems   Constitutional: Negative for activity change, appetite change, chills, diaphoresis, fatigue, fever, unexpected weight gain and unexpected weight loss.   HENT: Negative for congestion, dental problem, drooling, ear discharge, ear pain, facial swelling, hearing loss, mouth sores, nosebleeds, postnasal drip, rhinorrhea, sinus pressure, sneezing, sore throat, swollen glands, tinnitus, trouble swallowing and voice change.    Eyes: Negative for blurred vision, double vision, photophobia, pain, discharge, redness, itching and visual disturbance.   Respiratory: Negative for apnea, cough, choking, chest tightness, shortness of breath, wheezing and stridor.    Cardiovascular: Negative for chest pain, palpitations and leg swelling.   Gastrointestinal: Negative for abdominal distention, abdominal pain, anal bleeding, anorexia, blood in stool, constipation, diarrhea, nausea, rectal pain, vomiting, GERD and indigestion.   Endocrine: Negative for cold intolerance, heat intolerance, polydipsia, polyphagia and polyuria.   Genitourinary: Positive for vaginal bleeding. Negative for amenorrhea, breast discharge, breast lump, breast pain, decreased libido, decreased urine volume, difficulty urinating, dyspareunia, dysuria, flank pain, frequency, genital sores, hematuria, menstrual problem, pelvic pain, pelvic pressure, urgency, urinary incontinence, vaginal discharge and vaginal pain.   Musculoskeletal: Negative for arthralgias, back pain, gait problem, joint pain, joint swelling, myalgias, neck pain, neck stiffness and bursitis.    Skin: Negative for color change, dry skin and rash.   Allergic/Immunologic: Negative for environmental allergies, food allergies and immunocompromised state.   Neurological: Negative for dizziness, tremors, seizures, syncope, facial asymmetry, speech difficulty, weakness, light-headedness, numbness, headache, memory problem and confusion.   Hematological: Negative for adenopathy. Does not bruise/bleed easily.   Psychiatric/Behavioral: Negative for agitation, behavioral problems, decreased concentration, dysphoric mood, hallucinations, self-injury, sleep disturbance, suicidal ideas, negative for hyperactivity, depressed mood and stress. The patient is not nervous/anxious.        Objective   Physical Exam  Vitals and nursing note reviewed. Exam conducted with a chaperone present.   Constitutional:       Appearance: She is well-developed.   HENT:      Head: Normocephalic and atraumatic.   Abdominal:      General: There is no distension.      Palpations: Abdomen is soft.      Tenderness: There is no abdominal tenderness.      Hernia: There is no hernia in the left inguinal area or right inguinal area.   Genitourinary:     General: Normal vulva.      Exam position: Lithotomy position.      Labia:         Right: No rash, tenderness, lesion or injury.         Left: No rash, tenderness, lesion or injury.       Vagina: Vaginal discharge present. No erythema, tenderness or bleeding.      Cervix: Normal.      Uterus: Normal. Not enlarged and not tender.       Adnexa: Right adnexa normal and left adnexa normal.        Right: No mass, tenderness or fullness.          Left: No mass, tenderness or fullness.        Comments: No endocervical polyp is noted.  Lymphadenopathy:      Lower Body: No right inguinal adenopathy. No left inguinal adenopathy.   Skin:     General: Skin is warm and dry.   Neurological:      Mental Status: She is alert and oriented to person, place, and time.   Psychiatric:         Mood and Affect: Mood  normal.         Behavior: Behavior normal.         Thought Content: Thought content normal.         Judgment: Judgment normal.         Assessment & Plan   Diagnoses and all orders for this visit:    1. PCB (post coital bleeding) (Primary)  Comments:  Patient has complaints of postcoital bleeding.  States that this happened at least 6-7 times.  She states it is bright red.  On exam there is no endocervical polyp noted.  Her cervix is slightly friable and is cauterized with silver nitrate.  She does have some discharge so we discussed she could have had low-grade infection and not be aware of it.  She will schedule for her yearly checkup which is due in April today.    2. Vaginal discharge  Comments:  Patient has a small amount of yellow discharge.  BV panel sent to lab.  Orders:  -     Gynecologic Fluid, Supplemental Testing         BMI is >= 25 and <30. (Overweight) The following options were offered after discussion;: weight loss educational material (shared in after visit summary), exercise counseling/recommendations and nutrition counseling/recommendations      Ashley Walsh, APRN  2/13/2023

## 2023-02-15 LAB
LAB AP CASE REPORT: NORMAL
Lab: NORMAL
PATH INTERP SPEC-IMP: NORMAL
TRICHOMONAS VAGINALIS PCR: NOT DETECTED

## 2023-04-25 ENCOUNTER — OFFICE VISIT (OUTPATIENT)
Dept: OBSTETRICS AND GYNECOLOGY | Facility: CLINIC | Age: 33
End: 2023-04-25
Payer: COMMERCIAL

## 2023-04-25 VITALS
DIASTOLIC BLOOD PRESSURE: 74 MMHG | HEIGHT: 62 IN | WEIGHT: 158 LBS | BODY MASS INDEX: 29.08 KG/M2 | SYSTOLIC BLOOD PRESSURE: 126 MMHG

## 2023-04-25 DIAGNOSIS — D22.9 ATYPICAL MOLE: ICD-10-CM

## 2023-04-25 DIAGNOSIS — Z01.419 WELL WOMAN EXAM WITH ROUTINE GYNECOLOGICAL EXAM: Primary | ICD-10-CM

## 2023-04-25 PROCEDURE — G0123 SCREEN CERV/VAG THIN LAYER: HCPCS | Performed by: NURSE PRACTITIONER

## 2023-04-25 PROCEDURE — 99395 PREV VISIT EST AGE 18-39: CPT | Performed by: NURSE PRACTITIONER

## 2023-04-25 PROCEDURE — 87624 HPV HI-RISK TYP POOLED RSLT: CPT | Performed by: NURSE PRACTITIONER

## 2023-04-25 PROCEDURE — 87661 TRICHOMONAS VAGINALIS AMPLIF: CPT | Performed by: NURSE PRACTITIONER

## 2023-04-25 NOTE — PROGRESS NOTES
"Subjective     Jessy Melchor is a 32 y.o. female    History of Present Illness  Patient is here today for yearly checkup.  She has no complaints.  Gynecologic Exam  The patient's pertinent negatives include no pelvic pain, vaginal bleeding or vaginal discharge. The patient is experiencing no pain. Pertinent negatives include no abdominal pain, anorexia, back pain, chills, constipation, diarrhea, discolored urine, dysuria, fever, flank pain, frequency, headaches, hematuria, joint pain, joint swelling, nausea, painful intercourse, rash, sore throat, urgency or vomiting. She is sexually active. She uses nothing for contraception. Her menstrual history has been regular.         /74   Ht 157.5 cm (62\")   Wt 71.7 kg (158 lb)   LMP 04/16/2023 (Exact Date)   Breastfeeding Yes   BMI 28.90 kg/m²     No outpatient encounter medications on file as of 4/25/2023.     No facility-administered encounter medications on file as of 4/25/2023.       Surgical History  Past Surgical History:   Procedure Laterality Date   • URETEROSCOPY LASER LITHOTRIPSY WITH STENT INSERTION Right 4/22/2022    Procedure: RIGHT URETEROSCOPY LASER LITHOTRIPSY WITH STENT INSERTION;  Surgeon: Alistair Arellano MD;  Location: [facility];  Service: Urology;  Laterality: Right;   • WISDOM TOOTH EXTRACTION         Family History  Family History   Problem Relation Age of Onset   • Hypertension Father    • No Known Problems Mother    • No Known Problems Sister    • No Known Problems Sister    • Arthritis Paternal Grandfather    • Melanoma Paternal Grandmother 70   • Diabetes Maternal Aunt    • Pancreatic cancer Paternal Aunt    • Breast cancer Neg Hx    • Ovarian cancer Neg Hx    • Uterine cancer Neg Hx    • Colon cancer Neg Hx    • Prostate cancer Neg Hx        The following portions of the patient's history were reviewed and updated as appropriate: allergies, current medications, past family history, past medical history, past social history, past " surgical history, and problem list.    Review of Systems   Constitutional: Negative for activity change, appetite change, chills, diaphoresis, fatigue, fever, unexpected weight gain and unexpected weight loss.   HENT: Negative for congestion, dental problem, drooling, ear discharge, ear pain, facial swelling, hearing loss, mouth sores, nosebleeds, postnasal drip, rhinorrhea, sinus pressure, sneezing, sore throat, swollen glands, tinnitus, trouble swallowing and voice change.    Eyes: Negative for blurred vision, double vision, photophobia, pain, discharge, redness, itching and visual disturbance.   Respiratory: Negative for apnea, cough, choking, chest tightness, shortness of breath, wheezing and stridor.    Cardiovascular: Negative for chest pain, palpitations and leg swelling.   Gastrointestinal: Negative for abdominal distention, abdominal pain, anal bleeding, anorexia, blood in stool, constipation, diarrhea, nausea, rectal pain, vomiting, GERD and indigestion.   Endocrine: Negative for cold intolerance, heat intolerance, polydipsia, polyphagia and polyuria.   Genitourinary: Negative for amenorrhea, breast discharge, breast lump, breast pain, decreased libido, decreased urine volume, difficulty urinating, dyspareunia, dysuria, flank pain, frequency, genital sores, hematuria, menstrual problem, pelvic pain, pelvic pressure, urgency, urinary incontinence, vaginal bleeding, vaginal discharge and vaginal pain.   Musculoskeletal: Negative for arthralgias, back pain, gait problem, joint pain, joint swelling, myalgias, neck pain, neck stiffness and bursitis.   Skin: Negative for color change, dry skin and rash.   Allergic/Immunologic: Negative for environmental allergies, food allergies and immunocompromised state.   Neurological: Negative for dizziness, tremors, seizures, syncope, facial asymmetry, speech difficulty, weakness, light-headedness, numbness, headache, memory problem and confusion.   Hematological: Negative  for adenopathy. Does not bruise/bleed easily.   Psychiatric/Behavioral: Negative for agitation, behavioral problems, decreased concentration, dysphoric mood, hallucinations, self-injury, sleep disturbance, suicidal ideas, negative for hyperactivity, depressed mood and stress. The patient is not nervous/anxious.        Objective   Physical Exam  Vitals and nursing note reviewed. Exam conducted with a chaperone present.   Constitutional:       General: She is not in acute distress.     Appearance: She is well-developed. She is not diaphoretic.   HENT:      Head: Normocephalic.      Right Ear: External ear normal.      Left Ear: External ear normal.      Nose: Nose normal.   Eyes:      General: No scleral icterus.        Right eye: No discharge.         Left eye: No discharge.      Conjunctiva/sclera: Conjunctivae normal.      Pupils: Pupils are equal, round, and reactive to light.   Neck:      Thyroid: No thyromegaly.      Vascular: No carotid bruit.      Trachea: No tracheal deviation.   Cardiovascular:      Rate and Rhythm: Normal rate and regular rhythm.      Heart sounds: Normal heart sounds. No murmur heard.  Pulmonary:      Effort: Pulmonary effort is normal. No respiratory distress.      Breath sounds: Normal breath sounds. No wheezing.   Chest:   Breasts:     Breasts are symmetrical.      Right: Normal. No swelling, bleeding, inverted nipple, mass, nipple discharge, skin change or tenderness.      Left: Normal. No swelling, bleeding, inverted nipple, mass, nipple discharge, skin change or tenderness.   Abdominal:      General: There is no distension.      Palpations: Abdomen is soft. There is no mass.      Tenderness: There is no abdominal tenderness. There is no right CVA tenderness, left CVA tenderness or guarding.      Hernia: No hernia is present. There is no hernia in the left inguinal area or right inguinal area.   Genitourinary:     General: Normal vulva.      Exam position: Lithotomy position.       Labia:         Right: No rash, tenderness, lesion or injury.         Left: No rash, tenderness, lesion or injury.       Vagina: Normal. No signs of injury and foreign body. No vaginal discharge, erythema, tenderness or bleeding.      Cervix: Normal.      Uterus: Normal. Not enlarged, not fixed and not tender.       Adnexa: Right adnexa normal and left adnexa normal.        Right: No mass, tenderness or fullness.          Left: No mass, tenderness or fullness.        Rectum: Normal. No mass.      Comments:   BSU normal  Urethral meatus  Normal  Perineum  Normal  Musculoskeletal:         General: No tenderness. Normal range of motion.      Cervical back: Normal range of motion and neck supple.   Lymphadenopathy:      Head:      Right side of head: No submental, submandibular, tonsillar, preauricular, posterior auricular or occipital adenopathy.      Left side of head: No submental, submandibular, tonsillar, preauricular, posterior auricular or occipital adenopathy.      Cervical: No cervical adenopathy.      Right cervical: No superficial, deep or posterior cervical adenopathy.     Left cervical: No superficial, deep or posterior cervical adenopathy.      Upper Body:      Right upper body: No supraclavicular, axillary or pectoral adenopathy.      Left upper body: No supraclavicular, axillary or pectoral adenopathy.      Lower Body: No right inguinal adenopathy. No left inguinal adenopathy.   Skin:     General: Skin is warm and dry.      Findings: No bruising, erythema or rash.          Neurological:      Mental Status: She is alert and oriented to person, place, and time.      Coordination: Coordination normal.   Psychiatric:         Mood and Affect: Mood normal.         Behavior: Behavior normal.         Thought Content: Thought content normal.         Judgment: Judgment normal.         Assessment & Plan   Diagnoses and all orders for this visit:    1. Well woman exam with routine gynecological exam (Primary)  Normal  GYN exam. Will have lab work at PCP. Encouraged SBE, pt is aware how to do self breast exam and the importance of same. Discussed weight management and importance of maintaining a healthy weight. Discussed Vitamin D intake and the importance of adequate vitamin D for both Bone Health and a healthy immune system.  Discussed Daily exercise and the importance of same, in regards to a healthy heart as well as helping to maintain her weight and improving her mental health.  BMI 28.9.  Pap smear is done per [facility] guidelines.  -     Liquid-based Pap Smear, Screening  -     HPV DNA Probe, Direct - ThinPrep Vial, Cervix    2. Atypical mole  Comments:  Patient has several atypical moles.  Patient is referred to Dr. Solano for further follow-up.  Orders:  -     Ambulatory Referral to Dermatology         BMI is >= 25 and <30. (Overweight) The following options were offered after discussion;: weight loss educational material (shared in after visit summary), exercise counseling/recommendations and nutrition counseling/recommendations      Ashley Walsh, APRN  4/25/2023

## 2023-04-26 LAB
GEN CATEG CVX/VAG CYTO-IMP: NORMAL
HPV I/H RISK 4 DNA CVX QL PROBE+SIG AMP: NOT DETECTED
LAB AP CASE REPORT: NORMAL
LAB AP GYN ADDITIONAL INFORMATION: NORMAL
LAB AP GYN OTHER FINDINGS: NORMAL
Lab: NORMAL
PATH INTERP SPEC-IMP: NORMAL
STAT OF ADQ CVX/VAG CYTO-IMP: NORMAL

## 2023-04-28 LAB — TRICHOMONAS VAGINALIS PCR: NOT DETECTED

## 2023-05-03 LAB
GEN CATEG CVX/VAG CYTO-IMP: NORMAL
LAB AP CASE REPORT: NORMAL
LAB AP GYN ADDITIONAL INFORMATION: NORMAL
LAB AP GYN OTHER FINDINGS: NORMAL
Lab: NORMAL
PATH INTERP SPEC-IMP: NORMAL
STAT OF ADQ CVX/VAG CYTO-IMP: NORMAL

## 2023-05-03 RX ORDER — FLUCONAZOLE 150 MG/1
150 TABLET ORAL ONCE
Qty: 2 TABLET | Refills: 0 | Status: SHIPPED | OUTPATIENT
Start: 2023-05-03 | End: 2023-05-03

## 2023-11-08 ENCOUNTER — INITIAL PRENATAL (OUTPATIENT)
Dept: OBSTETRICS AND GYNECOLOGY | Facility: CLINIC | Age: 33
End: 2023-11-08
Payer: COMMERCIAL

## 2023-11-08 VITALS — DIASTOLIC BLOOD PRESSURE: 74 MMHG | SYSTOLIC BLOOD PRESSURE: 128 MMHG | WEIGHT: 168 LBS | BODY MASS INDEX: 30.73 KG/M2

## 2023-11-08 DIAGNOSIS — Z3A.10 10 WEEKS GESTATION OF PREGNANCY: ICD-10-CM

## 2023-11-08 DIAGNOSIS — R11.0 PREGNANCY RELATED NAUSEA, ANTEPARTUM: ICD-10-CM

## 2023-11-08 DIAGNOSIS — O36.80X0 ENCOUNTER TO DETERMINE FETAL VIABILITY OF PREGNANCY, SINGLE OR UNSPECIFIED FETUS: Primary | ICD-10-CM

## 2023-11-08 DIAGNOSIS — Z36.3 SCREENING, ANTENATAL, FOR MALFORMATION BY ULTRASOUND: ICD-10-CM

## 2023-11-08 DIAGNOSIS — Z36.0 ENCOUNTER FOR ANTENATAL SCREENING FOR CHROMOSOMAL ANOMALIES: ICD-10-CM

## 2023-11-08 DIAGNOSIS — Z34.81 MULTIGRAVIDA IN FIRST TRIMESTER: ICD-10-CM

## 2023-11-08 DIAGNOSIS — O26.899 PREGNANCY RELATED NAUSEA, ANTEPARTUM: ICD-10-CM

## 2023-11-08 PROBLEM — Z34.90 PREGNANCY: Status: ACTIVE | Noted: 2023-11-08

## 2023-11-08 PROCEDURE — 0501F PRENATAL FLOW SHEET: CPT | Performed by: ADVANCED PRACTICE MIDWIFE

## 2023-11-08 NOTE — PATIENT INSTRUCTIONS
Doxylamine (Unisom) 1/2 or 1/4 tablet plus Vitamin B6 25 mg every 6 hours as needed for pregnancy nausea.  Take together for best result.

## 2023-11-10 LAB
ABO GROUP BLD: NORMAL
BACTERIA UR CULT: NORMAL
BACTERIA UR CULT: NORMAL
BASOPHILS # BLD AUTO: 0.05 10*3/MM3 (ref 0–0.2)
BASOPHILS NFR BLD AUTO: 0.5 % (ref 0–1.5)
BLD GP AB SCN SERPL QL: NEGATIVE
C TRACH RRNA SPEC QL NAA+PROBE: NEGATIVE
EOSINOPHIL # BLD AUTO: 0.2 10*3/MM3 (ref 0–0.4)
EOSINOPHIL NFR BLD AUTO: 2.1 % (ref 0.3–6.2)
ERYTHROCYTE [DISTWIDTH] IN BLOOD BY AUTOMATED COUNT: 15.2 % (ref 12.3–15.4)
HBV SURFACE AG SERPL QL IA: NEGATIVE
HCT VFR BLD AUTO: 37.4 % (ref 34–46.6)
HCV AB SERPL QL IA: NORMAL
HCV IGG SERPL QL IA: NON REACTIVE
HGB BLD-MCNC: 12.2 G/DL (ref 12–15.9)
HIV 1+2 AB+HIV1 P24 AG SERPL QL IA: NON REACTIVE
IMM GRANULOCYTES # BLD AUTO: 0.04 10*3/MM3 (ref 0–0.05)
IMM GRANULOCYTES NFR BLD AUTO: 0.4 % (ref 0–0.5)
LYMPHOCYTES # BLD AUTO: 2.41 10*3/MM3 (ref 0.7–3.1)
LYMPHOCYTES NFR BLD AUTO: 24.9 % (ref 19.6–45.3)
MCH RBC QN AUTO: 27.9 PG (ref 26.6–33)
MCHC RBC AUTO-ENTMCNC: 32.6 G/DL (ref 31.5–35.7)
MCV RBC AUTO: 85.6 FL (ref 79–97)
MONOCYTES # BLD AUTO: 0.59 10*3/MM3 (ref 0.1–0.9)
MONOCYTES NFR BLD AUTO: 6.1 % (ref 5–12)
N GONORRHOEA RRNA SPEC QL NAA+PROBE: NEGATIVE
NEUTROPHILS # BLD AUTO: 6.37 10*3/MM3 (ref 1.7–7)
NEUTROPHILS NFR BLD AUTO: 66 % (ref 42.7–76)
NRBC BLD AUTO-RTO: 0 /100 WBC (ref 0–0.2)
PLATELET # BLD AUTO: 347 10*3/MM3 (ref 140–450)
RBC # BLD AUTO: 4.37 10*6/MM3 (ref 3.77–5.28)
RH BLD: POSITIVE
RPR SER QL: NON REACTIVE
RUBV IGG SERPL IA-ACNC: 2.96 INDEX
T VAGINALIS RRNA SPEC QL NAA+PROBE: NEGATIVE
VZV IGG SER IA-ACNC: 2433 INDEX
WBC # BLD AUTO: 9.66 10*3/MM3 (ref 3.4–10.8)

## 2023-11-14 LAB — DRUGS UR: NORMAL

## 2023-11-20 NOTE — PROGRESS NOTES
33-year old patient arrived to initiate prenatal care.     HPI: 33-year old . Patient's last menstrual period was 2023. . Reports this was a desired pregnancy. Pre-pregnancy weight of 168 pounds.    Previous prenatal history significant for: TSVD x 2,   History significant for: slight prolapse after delivery of second child, kidney stones, UTI, NKDA    The following portion of the patient's history were reviewed and updated as needed: allergies, current medications, past family history, past medical history, social history, surgical history, and problem list.    ROS: All systems reviewed and are negative with exception of the following: amenorrhea, breast tenderness, fatigue, nausea      US ordered today, reviewed and shows IUP of 10w1d gestation and Estimated Date of Delivery: 24. Normal-appearing ovaries.     Last Pap Smear: 2023 NILM (ECTZ present); HPV not detected.      Exam:  Wt: 168 lb for TWG of 0 kg (0 lb), B/P 128/74, FHTs 175   General Appearance:  healthy-appearing .  HEENT:  NCAT, EOMI, neck supple, no thyroidmegaly.  HR str and reg. No murmur. Lungs clear. Resp even and unlabored.  Abd: Soft, nontender. Bowel sounds active. Uterus is consistent with EGA.  Ext: NT, nontender. No cyanosis or edema.    Diagnoses and all orders for this visit:    1. Encounter to determine fetal viability of pregnancy, single or unspecified fetus (Primary)  - Ultrasound today and reviewed: Viable conley intrauterine gestation. Crown-rump length measures 32.0 mm for estimated gestation age of 10 weeks, 1 day. This is within 1 day of the estimated gestational age by LMP.     2. 10 weeks gestation of pregnancy  - Initial prenatal labs discussed and ordered today.  -     ToxASSURE Select 13 (MW) - Urine, Clean Catch  -     ABO / Rh  -     CBC & Differential  -     Antibody Screen  -     Hepatitis B Surface Antigen  -     RPR, Rfx Qn RPR / Confirm TP  -     Rubella Antibody, IgG  -     Urine  Culture - , Urine, Clean Catch  -     HIV-1 / O / 2 Ag / Antibody  -     Varicella Zoster Antibody, IgG  -     Chlamydia trachomatis, Neisseria gonorrhoeae, Trichomonas vaginalis, PCR - Urine, Urine, Random Void  -     HCV Antibody Rfx To Qnt PCR  -     Ambulatory Referral to [facility]/Perinatology  -     [facility] Prenatal Test: Chromosomes 13, 18, 21, X & Y: Triploidy 22Q.11.2 Deletion - Blood,    3. Multigravida in first trimester  - Reviewed information in new OB packet, including OTC medications for use during pregnancy, first timester of pregnancy and discomforts, regular OB routine, ffDNA and maternal carrier screening testing.  First Trimester of Pregnancy video included in AVS.  - Discussed the options of maternal carrier screening and ffDNA risk screening tests. Reviewed the information from the [facility] handout in the initial prenatal folder.   - Advised to maintain regular activity.  - Reviewed and encouraged pt to report vaginal bleeding, pelvic pain or cramping, any prolonged illness or infection, or any other concerns.  - RTO in 4 weeks with Dr. Torres and as needed with concerns    4. Pregnancy related nausea, antepartum  - Discussed nonpharmacologic nausea relief measures, including small frequent meals, dry crackers upon rising, carbonated beverages, food/drink containing ginger (ginger ale, tea, gum), avoidance of triggers such as smells, accupressure wrist bands, rest, pregnancy pops, sour candies, aromatherapy. Morning Sickness education included in the AVS.  - Encouraged adequate hydration. Discussed signs of dehydration.  - Counseled on OTC pharmacologic tx with Vitamin B6 and Doxylamine. OTC instructions and Doxylamine; Vitamin B6 Delayed- and Extended-Release Tablets education included in the AVS.  - Call if symptoms fail to improve or worsen.    5. Encounter for  screening for chromosomal anomalies  - Discussed the option of ffDNA screening, including the [facility] handout from the  initial prenatal folder. She desires this testing today.  -     [facility] Bedolla Prenatal Test: Chromosomes 13, 18, 21, X & Y: Triploidy 22Q.11.2 Deletion - Blood,    6. Screening, , for malformation by ultrasound  - Discussed plan of referral to perinatology at approximately 20-weeks gestation for anatomy scan. Anatomy scan to assess for possible anomalies or markers for aneuploidy.   -     Ambulatory Referral to [facility]/Perinatology        This note has been signed electronically.     Sailaja Ga, DNP, APRN, CNM, RNC-OB

## 2023-12-06 ENCOUNTER — ROUTINE PRENATAL (OUTPATIENT)
Dept: OBSTETRICS AND GYNECOLOGY | Facility: CLINIC | Age: 33
End: 2023-12-06
Payer: COMMERCIAL

## 2023-12-06 VITALS — SYSTOLIC BLOOD PRESSURE: 142 MMHG | BODY MASS INDEX: 30.73 KG/M2 | DIASTOLIC BLOOD PRESSURE: 80 MMHG | WEIGHT: 168 LBS

## 2023-12-06 DIAGNOSIS — Z3A.14 14 WEEKS GESTATION OF PREGNANCY: Primary | ICD-10-CM

## 2023-12-06 DIAGNOSIS — Z34.82 PRENATAL CARE, SUBSEQUENT PREGNANCY, SECOND TRIMESTER: ICD-10-CM

## 2023-12-06 LAB
GLUCOSE UR STRIP-MCNC: NEGATIVE MG/DL
PROT UR STRIP-MCNC: NEGATIVE MG/DL

## 2023-12-06 PROCEDURE — 0502F SUBSEQUENT PRENATAL CARE: CPT | Performed by: OBSTETRICS & GYNECOLOGY

## 2023-12-06 NOTE — PROGRESS NOTES
No complaints. Reports n/v improved.  Normal NIPS. Did not have gender done as they want to wait until delivery. Pt reports FM. Denies VB, ctx, discharge.      /80   Wt 76.2 kg (168 lb)   LMP 08/30/2023   BMI 30.73 kg/m²    FHTs 142  Urine protein negative  Urine glucose negative    Diagnoses and all orders for this visit:    1. 14 weeks gestation of pregnancy (Primary)  -     POC Urinalysis Dipstick    2. Prenatal care, subsequent pregnancy, second trimester  IUP at 14+ weeks gestation here for return OB visit. Pt doing well. RTC 4 weeks. Does not want sneak peak US. Anatomy MFM scanned is scheduled.

## 2024-01-03 ENCOUNTER — LAB (OUTPATIENT)
Dept: LAB | Facility: HOSPITAL | Age: 34
End: 2024-01-03
Payer: COMMERCIAL

## 2024-01-03 ENCOUNTER — ROUTINE PRENATAL (OUTPATIENT)
Dept: OBSTETRICS AND GYNECOLOGY | Facility: CLINIC | Age: 34
End: 2024-01-03
Payer: COMMERCIAL

## 2024-01-03 ENCOUNTER — HOSPITAL ENCOUNTER (OUTPATIENT)
Dept: ULTRASOUND IMAGING | Facility: HOSPITAL | Age: 34
Discharge: HOME | End: 2024-01-03
Payer: COMMERCIAL

## 2024-01-03 VITALS — DIASTOLIC BLOOD PRESSURE: 78 MMHG | BODY MASS INDEX: 30.36 KG/M2 | SYSTOLIC BLOOD PRESSURE: 126 MMHG | WEIGHT: 166 LBS

## 2024-01-03 DIAGNOSIS — R10.9 ACUTE LEFT FLANK PAIN: ICD-10-CM

## 2024-01-03 DIAGNOSIS — R10.9 FLANK PAIN IN PREGNANT PATIENT: ICD-10-CM

## 2024-01-03 DIAGNOSIS — Z3A.18 18 WEEKS GESTATION OF PREGNANCY: Primary | ICD-10-CM

## 2024-01-03 DIAGNOSIS — O99.712 PRURITUS OF PREGNANCY IN SECOND TRIMESTER: ICD-10-CM

## 2024-01-03 DIAGNOSIS — O26.899 FLANK PAIN IN PREGNANT PATIENT: ICD-10-CM

## 2024-01-03 DIAGNOSIS — N20.0 LEFT NEPHROLITHIASIS: Primary | ICD-10-CM

## 2024-01-03 DIAGNOSIS — O09.522 AMA (ADVANCED MATERNAL AGE) MULTIGRAVIDA 35+, SECOND TRIMESTER: ICD-10-CM

## 2024-01-03 DIAGNOSIS — L29.9 PRURITUS OF PREGNANCY IN SECOND TRIMESTER: ICD-10-CM

## 2024-01-03 LAB
ALBUMIN SERPL-MCNC: 3.9 G/DL (ref 3.5–5.2)
ALBUMIN/GLOB SERPL: 1.3 G/DL
ALP SERPL-CCNC: 78 U/L (ref 39–117)
ALT SERPL W P-5'-P-CCNC: 7 U/L (ref 1–33)
AMORPH URATE CRY URNS QL MICRO: ABNORMAL /HPF
ANION GAP SERPL CALCULATED.3IONS-SCNC: 12 MMOL/L (ref 5–15)
AST SERPL-CCNC: 12 U/L (ref 1–32)
BACTERIA UR QL AUTO: ABNORMAL /HPF
BASOPHILS # BLD AUTO: 0.03 10*3/MM3 (ref 0–0.2)
BASOPHILS NFR BLD AUTO: 0.2 % (ref 0–1.5)
BILE AC SERPL-SCNC: 1 UMOL/L (ref 0–10)
BILIRUB SERPL-MCNC: 0.2 MG/DL (ref 0–1.2)
BILIRUB UR QL STRIP: NEGATIVE
BUN SERPL-MCNC: 7 MG/DL (ref 6–20)
BUN/CREAT SERPL: 10.8 (ref 7–25)
CALCIUM SPEC-SCNC: 8.7 MG/DL (ref 8.6–10.5)
CHLORIDE SERPL-SCNC: 101 MMOL/L (ref 98–107)
CLARITY UR: ABNORMAL
CO2 SERPL-SCNC: 21 MMOL/L (ref 22–29)
COD CRY URNS QL: ABNORMAL /HPF
COLOR UR: ABNORMAL
CREAT SERPL-MCNC: 0.65 MG/DL (ref 0.57–1)
DEPRECATED RDW RBC AUTO: 42.6 FL (ref 37–54)
EGFRCR SERPLBLD CKD-EPI 2021: 118.7 ML/MIN/1.73
EOSINOPHIL # BLD AUTO: 0.1 10*3/MM3 (ref 0–0.4)
EOSINOPHIL NFR BLD AUTO: 0.8 % (ref 0.3–6.2)
ERYTHROCYTE [DISTWIDTH] IN BLOOD BY AUTOMATED COUNT: 14.4 % (ref 12.3–15.4)
GLOBULIN UR ELPH-MCNC: 3 GM/DL
GLUCOSE SERPL-MCNC: 95 MG/DL (ref 65–99)
GLUCOSE UR STRIP-MCNC: NEGATIVE MG/DL
GLUCOSE UR STRIP-MCNC: NEGATIVE MG/DL
HCT VFR BLD AUTO: 35.2 % (ref 34–46.6)
HGB BLD-MCNC: 11.4 G/DL (ref 12–15.9)
HGB UR QL STRIP.AUTO: ABNORMAL
HYALINE CASTS UR QL AUTO: ABNORMAL /LPF
IMM GRANULOCYTES # BLD AUTO: 0.1 10*3/MM3 (ref 0–0.05)
IMM GRANULOCYTES NFR BLD AUTO: 0.8 % (ref 0–0.5)
KETONES UR QL STRIP: ABNORMAL
LEUKOCYTE ESTERASE UR QL STRIP.AUTO: ABNORMAL
LYMPHOCYTES # BLD AUTO: 1.79 10*3/MM3 (ref 0.7–3.1)
LYMPHOCYTES NFR BLD AUTO: 14.6 % (ref 19.6–45.3)
MCH RBC QN AUTO: 26.9 PG (ref 26.6–33)
MCHC RBC AUTO-ENTMCNC: 32.4 G/DL (ref 31.5–35.7)
MCV RBC AUTO: 83 FL (ref 79–97)
MONOCYTES # BLD AUTO: 0.59 10*3/MM3 (ref 0.1–0.9)
MONOCYTES NFR BLD AUTO: 4.8 % (ref 5–12)
MUCOUS THREADS URNS QL MICRO: ABNORMAL /HPF
NEUTROPHILS NFR BLD AUTO: 78.8 % (ref 42.7–76)
NEUTROPHILS NFR BLD AUTO: 9.67 10*3/MM3 (ref 1.7–7)
NITRITE UR QL STRIP: NEGATIVE
NRBC BLD AUTO-RTO: 0 /100 WBC (ref 0–0.2)
PH UR STRIP.AUTO: 7 [PH] (ref 5–8)
PLATELET # BLD AUTO: 329 10*3/MM3 (ref 140–450)
PMV BLD AUTO: 10 FL (ref 6–12)
POTASSIUM SERPL-SCNC: 3.5 MMOL/L (ref 3.5–5.2)
PROT SERPL-MCNC: 6.9 G/DL (ref 6–8.5)
PROT UR QL STRIP: ABNORMAL
PROT UR STRIP-MCNC: ABNORMAL MG/DL
RBC # BLD AUTO: 4.24 10*6/MM3 (ref 3.77–5.28)
RBC # UR STRIP: ABNORMAL /HPF
RBC # UR STRIP: ABNORMAL /UL
REF LAB TEST METHOD: ABNORMAL
SODIUM SERPL-SCNC: 134 MMOL/L (ref 136–145)
SP GR UR STRIP: 1.02 (ref 1–1.03)
SQUAMOUS #/AREA URNS HPF: ABNORMAL /HPF
UROBILINOGEN UR QL STRIP: ABNORMAL
WBC # UR STRIP: ABNORMAL /HPF
WBC NRBC COR # BLD AUTO: 12.28 10*3/MM3 (ref 3.4–10.8)

## 2024-01-03 PROCEDURE — 81001 URINALYSIS AUTO W/SCOPE: CPT | Performed by: ADVANCED PRACTICE MIDWIFE

## 2024-01-03 PROCEDURE — 85025 COMPLETE CBC W/AUTO DIFF WBC: CPT | Performed by: ADVANCED PRACTICE MIDWIFE

## 2024-01-03 PROCEDURE — 36415 COLL VENOUS BLD VENIPUNCTURE: CPT | Performed by: ADVANCED PRACTICE MIDWIFE

## 2024-01-03 PROCEDURE — 0502F SUBSEQUENT PRENATAL CARE: CPT | Performed by: ADVANCED PRACTICE MIDWIFE

## 2024-01-03 PROCEDURE — 87086 URINE CULTURE/COLONY COUNT: CPT | Performed by: ADVANCED PRACTICE MIDWIFE

## 2024-01-03 PROCEDURE — 80053 COMPREHEN METABOLIC PANEL: CPT | Performed by: ADVANCED PRACTICE MIDWIFE

## 2024-01-03 PROCEDURE — 76775 US EXAM ABDO BACK WALL LIM: CPT

## 2024-01-03 PROCEDURE — 82239 BILE ACIDS TOTAL: CPT | Performed by: ADVANCED PRACTICE MIDWIFE

## 2024-01-03 RX ORDER — CYCLOBENZAPRINE HCL 10 MG
10 TABLET ORAL EVERY 8 HOURS PRN
Qty: 30 TABLET | Refills: 1 | Status: SHIPPED | OUTPATIENT
Start: 2024-01-03

## 2024-01-03 RX ORDER — ACETAMINOPHEN 500 MG
500 TABLET ORAL EVERY 6 HOURS PRN
COMMUNITY

## 2024-01-03 RX ORDER — ONDANSETRON 4 MG/1
4 TABLET, FILM COATED ORAL EVERY 8 HOURS PRN
Qty: 30 TABLET | Refills: 1 | Status: SHIPPED | OUTPATIENT
Start: 2024-01-03 | End: 2025-01-02

## 2024-01-03 RX ORDER — CALCIUM CARBONATE 500 MG/1
1 TABLET, CHEWABLE ORAL DAILY
COMMUNITY

## 2024-01-03 NOTE — PROGRESS NOTES
Reason for visit: Routine OB visit at 18w1d. SARATH 2024, by Ultrasound    CC:  She started to have back pain yesterday and noticed blood in her urine. It let up closer to bed time but the pain started again this morning about 0400. Denies VB, LOF, pelvic pain, or cramping.     ROS: All systems reviewed and are negative with exception of the following: left flank pain, difficulty urinating, hematuria, nausea, pruritus    Wt 166 lb for a TWG of -0.907 kg (-2 lb), /78, FHTs 149  Urine today and reviewed: negative  glucose, trace protein    Anatomy Scan: scheduled for 2024 at 1330    Exam:  General Appearance:  Healthy appearing . Normal mood and behavior.  HEENT: NCAT, EOMI  HR str and reg. Lungs clear. Resp even and unlabored.  Abdomen is soft and nontender. Left CVA tenderness. Uterus is consistent with EGA  Ext: no edema, nontender, no trauma, or cyanosis.    Impression  Diagnoses and all orders for this visit:    1. 18 weeks gestation of pregnancy (Primary)  -     POC Urinalysis Dipstick  -     Urinalysis With Microscopic If Indicated (No Culture) - Urine, Clean Catch  -     Urine Culture - Urine, Urine, Random Void  -     US renal bilateral  -     ondansetron (Zofran) 4 MG tablet; Take 1 tablet by mouth Every 8 (Eight) Hours As Needed for Nausea or Vomiting.  Dispense: 30 tablet; Refill: 1  -     cyclobenzaprine (FLEXERIL) 10 MG tablet; Take 1 tablet by mouth Every 8 (Eight) Hours As Needed for Muscle Spasms (flank pain).  Dispense: 30 tablet; Refill: 1  -     Bile Acids, Total  -     CBC & Differential  -     Comprehensive Metabolic Panel  -     Urinalysis, Microscopic Only - Urine, Clean Catch    2. AMA (advanced maternal age) multigravida 35+, second trimester  Discussed second trimester of pregnancy, discomforts and measures of support, fetal movement, pelvic pain warnings, bleeding warnings, and signs and symptoms to report. Reviewed low risk fetal chromosomal screening results. Discussed  and encouraged to call or come to the hospital with vaginal bleeding, leaking of fluid, pelvic pain, or other concerns. Second Trimester of Pregnancy video and Round Ligament Pain education included in the AVS.    3. Pruritus of pregnancy in second trimester  Discussed measures of support, including use of hydrocortisone cream, benadryl cream, antihistamines, Pepcid, oatmeal bath. Also discussed signs to report and different causes of itching and rashes in pregnancy. Will draw labs today.   -     Bile Acids, Total  -     CBC & Differential  -     Comprehensive Metabolic Panel    4. Acute left flank pain  Encouraged fluids. Plan for renal ultrasound, medication for associated nausea, and flexeril for comfort. She will also call her urologist for follow-up as she has been treated for kidney stones in the past and feels this is similar pain. Discussed reasons to call or come to the hospital, including dysuria, fever, inability to void, worsening symptoms.   -     US renal bilateral  -     ondansetron (Zofran) 4 MG tablet; Take 1 tablet by mouth Every 8 (Eight) Hours As Needed for Nausea or Vomiting.  Dispense: 30 tablet; Refill: 1  -     cyclobenzaprine (FLEXERIL) 10 MG tablet; Take 1 tablet by mouth Every 8 (Eight) Hours As Needed for Muscle Spasms (flank pain).  Dispense: 30 tablet; Refill: 1          Return to the office in 4 weeks for a routine prenatal visit with Dr. Torres and as needed with concerns.        This note has been signed electronically.    Sailaja Ga, DNP, APRN, CNM, RNC-OB

## 2024-01-04 LAB — BACTERIA SPEC AEROBE CULT: NORMAL

## 2024-01-04 NOTE — PROGRESS NOTES
Subjective    Ms. Melchor is 33 y.o. female    Chief Complaint: left flank pain, hx of kidney stones    History of Present Illness    33-year-old female established patient in with new complaint of flank pain that started earlier this week.  Reports initially was bilaterally later shifting to the left.  Patient currently 18 weeks gestation with her third child.  History of kidney stones last treated by right ureteroscopy laser lithotripsy 4/2022 by Dr. Arellano for a 5 mm right distal ureteral stone.  Underwent renal ultrasound prior to visit revealing a 5 mm left lower pole renal stone however no hydronephrosis noted.  Patient does appear to have bilateral mild renal pelvic fullness.    Currently reporting over the past 2 days the flank pain has resolved.     I independently visualized and reviewed the patient's prior imaging studies today in clinic and discussed the imaging findings with the patient.    The following portions of the patient's history were reviewed and updated as appropriate: allergies, current medications, past family history, past medical history, past social history, past surgical history and problem list.    Review of Systems   Constitutional:  Negative for chills and fever.   Gastrointestinal:  Negative for abdominal pain, anal bleeding, blood in stool, nausea and vomiting.   Genitourinary:  Positive for flank pain. Negative for decreased urine volume, difficulty urinating, dysuria, frequency, hematuria, pelvic pain, urgency and vaginal pain.         Current Outpatient Medications:     acetaminophen (TYLENOL) 500 MG tablet, Take 1 tablet by mouth Every 6 (Six) Hours As Needed for Mild Pain., Disp: , Rfl:     calcium carbonate (TUMS) 500 MG chewable tablet, Chew 1 tablet Daily., Disp: , Rfl:     cyclobenzaprine (FLEXERIL) 10 MG tablet, Take 1 tablet by mouth Every 8 (Eight) Hours As Needed for Muscle Spasms (flank pain)., Disp: 30 tablet, Rfl: 1    ondansetron (Zofran) 4 MG tablet, Take 1 tablet by  "mouth Every 8 (Eight) Hours As Needed for Nausea or Vomiting., Disp: 30 tablet, Rfl: 1    Prenatal Multivit-Min-Fe-FA (PRENATAL 1 + IRON PO), Take  by mouth., Disp: , Rfl:     Past Medical History:   Diagnosis Date    Kidney stone     x 2 hospitalized    Pelvic prolapse     Slight bladder prolapse after 2nd child ‘21    UTI (urinary tract infection)        Past Surgical History:   Procedure Laterality Date    URETEROSCOPY LASER LITHOTRIPSY WITH STENT INSERTION Right 4/22/2022    Procedure: RIGHT URETEROSCOPY LASER LITHOTRIPSY WITH STENT INSERTION;  Surgeon: Alistair Arellano MD;  Location: [facility] OR;  Service: Urology;  Laterality: Right;    WISDOM TOOTH EXTRACTION         Social History     Socioeconomic History    Marital status:      Spouse name: Apolinar   Tobacco Use    Smoking status: Never    Smokeless tobacco: Never   Vaping Use    Vaping Use: Never used   Substance and Sexual Activity    Alcohol use: Not Currently     Comment: Probably a glass of wine once a month    Drug use: Never    Sexual activity: Yes     Partners: Male     Birth control/protection: Natural family planning/Rhythm       Family History   Problem Relation Age of Onset    Hypertension Father     No Known Problems Mother     No Known Problems Sister     No Known Problems Sister     Arthritis Paternal Grandfather     Melanoma Paternal Grandmother 70    Diabetes Maternal Aunt     Pancreatic cancer Paternal Aunt     Breast cancer Neg Hx     Ovarian cancer Neg Hx     Uterine cancer Neg Hx     Colon cancer Neg Hx     Prostate cancer Neg Hx        Objective    Temp 97.8 °F (36.6 °C)   Ht 157.5 cm (62.01\")   Wt 75.3 kg (166 lb)   LMP 08/30/2023   BMI 30.35 kg/m²     Physical Exam  Nursing note reviewed.   Constitutional:       General: She is not in acute distress.     Appearance: Normal appearance. She is not ill-appearing.   HENT:      Nose: No congestion.   Abdominal:      Tenderness: There is no right CVA tenderness or left CVA " tenderness.      Hernia: No hernia is present.   Skin:     General: Skin is warm and dry.   Neurological:      Mental Status: She is alert and oriented to person, place, and time.   Psychiatric:         Mood and Affect: Mood normal.         Behavior: Behavior normal.             Results for orders placed or performed in visit on 01/05/24   POC Urinalysis Dipstick, Multipro    Specimen: Urine   Result Value Ref Range    Color Yellow Yellow, Straw, Dark Yellow, Sydnee    Clarity, UA Clear Clear    Glucose, UA Negative Negative mg/dL    Bilirubin Negative Negative    Ketones, UA Negative Negative    Specific Gravity  1.025 1.005 - 1.030    Blood, UA Small (A) Negative    pH, Urine 6.5 5.0 - 8.0    Protein, POC 30 mg/dL (A) Negative mg/dL    Urobilinogen, UA 0.2 E.U./dL Normal, 0.2 E.U./dL    Nitrite, UA Negative Negative    Leukocytes Trace (A) Negative   Independent renal ultrasound review  The renal ultrasound is available for me to review.  Treatment recommendations require an independent review.  This film has been reviewed by the radiologist to determine any non urologic abnormalities that are presents.  I inspected the kidneys for size, symmetry, contour, parenchymal thickness, perinephric reaction, presence of calcifications, and intrarenal dilation of the collecting system.  This US shows no hydronephrosis visualized to represent a ureteral stone.  A left lower pole renal stone measuring approximately 5 mm noted.    Assessment and Plan    Diagnoses and all orders for this visit:    1. Kidney stone (Primary)  -     POC Urinalysis Dipstick, Multipro  -     XR Abdomen KUB; Future      Currently renal ultrasound no hydronephrosis visualized to represent a ureteral stone.  A left lower pole renal stone measuring approximately 5 mm noted.bilateral mild renal pelvic fullness left greater than right.    Urinalysis showing small blood and trace leukocytes.  Will go ahead and send urine through send out resolve [email or web address] PCR  urine culture testing to rule out any bacterial growth contributing to symptoms.    Patient encouraged to contact our office if pain continues to worsen however for now recommend following up shortly after the birth of her child with a KUB for further evaluation.

## 2024-01-05 ENCOUNTER — OFFICE VISIT (OUTPATIENT)
Dept: UROLOGY | Facility: CLINIC | Age: 34
End: 2024-01-05
Payer: COMMERCIAL

## 2024-01-05 VITALS — BODY MASS INDEX: 30.55 KG/M2 | TEMPERATURE: 97.8 F | HEIGHT: 62 IN | WEIGHT: 166 LBS

## 2024-01-05 DIAGNOSIS — N20.0 KIDNEY STONE: Primary | ICD-10-CM

## 2024-01-05 LAB
BILIRUB BLD-MCNC: NEGATIVE MG/DL
CLARITY, POC: CLEAR
COLOR UR: YELLOW
GLUCOSE UR STRIP-MCNC: NEGATIVE MG/DL
KETONES UR QL: NEGATIVE
LEUKOCYTE EST, POC: ABNORMAL
NITRITE UR-MCNC: NEGATIVE MG/ML
PH UR: 6.5 [PH] (ref 5–8)
PROT UR STRIP-MCNC: ABNORMAL MG/DL
RBC # UR STRIP: ABNORMAL /UL
SP GR UR: 1.02 (ref 1–1.03)
UROBILINOGEN UR QL: ABNORMAL

## 2024-01-12 ENCOUNTER — TELEPHONE (OUTPATIENT)
Dept: UROLOGY | Facility: CLINIC | Age: 34
End: 2024-01-12
Payer: COMMERCIAL

## 2024-01-12 DIAGNOSIS — N30.00 ACUTE CYSTITIS WITHOUT HEMATURIA: Primary | ICD-10-CM

## 2024-01-12 RX ORDER — AMOXICILLIN 500 MG/1
500 CAPSULE ORAL 2 TIMES DAILY
Qty: 20 CAPSULE | Refills: 0 | Status: SHIPPED | OUTPATIENT
Start: 2024-01-12

## 2024-01-12 NOTE — TELEPHONE ENCOUNTER
----- Message from ANGELA Sanchez sent at 1/12/2024 12:50 PM CST -----  Urine culture is positive for bacteria.  I will send in Augmentin as I believe this would probably be the safest option given patient's pregnant however would still like for patient to check with her PCP to verify that this medication is safe.

## 2024-01-22 ENCOUNTER — TELEPHONE (OUTPATIENT)
Dept: UROLOGY | Facility: CLINIC | Age: 34
End: 2024-01-22

## 2024-01-22 ENCOUNTER — PROCEDURE VISIT (OUTPATIENT)
Dept: UROLOGY | Facility: CLINIC | Age: 34
End: 2024-01-22
Payer: COMMERCIAL

## 2024-01-22 DIAGNOSIS — N30.00 ACUTE CYSTITIS WITHOUT HEMATURIA: Primary | ICD-10-CM

## 2024-01-22 PROCEDURE — 87086 URINE CULTURE/COLONY COUNT: CPT

## 2024-01-22 PROCEDURE — 51701 INSERT BLADDER CATHETER: CPT

## 2024-01-22 NOTE — PROGRESS NOTES
Patient of Tiffany Simmons ANGELA is here today complaining of flank pain. The patient denies any fever/chills. Clean catch urine obtained. Urine sent for C&S.    Tiffany Simmons APRNICO was in the office at the time of procedure. Patient was advised that we will call with results. Patient verbalized understanding.   Xavi  I have reviewed and agree with medical assistance documentation above

## 2024-01-23 ENCOUNTER — TELEPHONE (OUTPATIENT)
Dept: UROLOGY | Facility: CLINIC | Age: 34
End: 2024-01-23
Payer: COMMERCIAL

## 2024-01-23 LAB — BACTERIA SPEC AEROBE CULT: NO GROWTH

## 2024-01-23 NOTE — TELEPHONE ENCOUNTER
Spoke with patient to let her know there was no bacterial growth. Patient verbalized understanding

## 2024-01-31 ENCOUNTER — ROUTINE PRENATAL (OUTPATIENT)
Dept: OBSTETRICS AND GYNECOLOGY | Age: 34
End: 2024-01-31
Payer: COMMERCIAL

## 2024-01-31 VITALS — SYSTOLIC BLOOD PRESSURE: 112 MMHG | DIASTOLIC BLOOD PRESSURE: 84 MMHG | BODY MASS INDEX: 30.17 KG/M2 | WEIGHT: 165 LBS

## 2024-01-31 DIAGNOSIS — Z3A.22 22 WEEKS GESTATION OF PREGNANCY: Primary | ICD-10-CM

## 2024-01-31 DIAGNOSIS — Z34.82 PRENATAL CARE, SUBSEQUENT PREGNANCY, SECOND TRIMESTER: ICD-10-CM

## 2024-01-31 LAB
GLUCOSE UR STRIP-MCNC: NEGATIVE MG/DL
PROT UR STRIP-MCNC: ABNORMAL MG/DL

## 2024-01-31 NOTE — PROGRESS NOTES
Pt doing well. Had a recent kidney stone but feeling much better. Denies urinary symptoms.  Reports GFM. Denies VB, discharge, LOF, ctx.     /84   Wt 74.8 kg (165 lb)   LMP 08/30/2023   BMI 30.17 kg/m²    FHTs 144  FH 22  Urine glucose negative  Urine protein trace    Diagnoses and all orders for this visit:    1. 22 weeks gestation of pregnancy (Primary)  -     POC Urinalysis Dipstick    2. Prenatal care, subsequent pregnancy, second trimester  IUP at 22 weeks gestation, doing well. Recent kidney stone, but feeling much better.  UA negative today. RTC 4 weeks. Will need 32 week US for growth due to BMI.

## 2024-02-28 ENCOUNTER — ROUTINE PRENATAL (OUTPATIENT)
Dept: OBSTETRICS AND GYNECOLOGY | Age: 34
End: 2024-02-28
Payer: COMMERCIAL

## 2024-02-28 VITALS — SYSTOLIC BLOOD PRESSURE: 118 MMHG | BODY MASS INDEX: 31.09 KG/M2 | WEIGHT: 170 LBS | DIASTOLIC BLOOD PRESSURE: 70 MMHG

## 2024-02-28 DIAGNOSIS — Z34.82 PRENATAL CARE, SUBSEQUENT PREGNANCY, SECOND TRIMESTER: ICD-10-CM

## 2024-02-28 DIAGNOSIS — Z3A.26 26 WEEKS GESTATION OF PREGNANCY: Primary | ICD-10-CM

## 2024-02-28 LAB
GLUCOSE UR STRIP-MCNC: NEGATIVE MG/DL
PROT UR STRIP-MCNC: NEGATIVE MG/DL

## 2024-02-28 PROCEDURE — 0502F SUBSEQUENT PRENATAL CARE: CPT | Performed by: OBSTETRICS & GYNECOLOGY

## 2024-02-28 NOTE — PROGRESS NOTES
Passed her kidney stone a few days after her last visit but had minimal pain at that time. No further issues. GFM. Denies ctx, VB, LOF.      /70   Wt 77.1 kg (170 lb)   LMP 08/30/2023   BMI 31.09 kg/m²    FHTs 139  Urine protein and glucose negative    Diagnoses and all orders for this visit:    1. 26 weeks gestation of pregnancy (Primary)  -     POC Urinalysis Dipstick    2. Prenatal care, subsequent pregnancy, second trimester  IUP at 26 weeks gestation, doing well. 1 hour GTT next visit. RTC 2 weeks. US for growth at 32 weeks gestation.

## 2024-03-13 ENCOUNTER — ROUTINE PRENATAL (OUTPATIENT)
Dept: OBSTETRICS AND GYNECOLOGY | Age: 34
End: 2024-03-13
Payer: COMMERCIAL

## 2024-03-13 VITALS — SYSTOLIC BLOOD PRESSURE: 106 MMHG | WEIGHT: 167 LBS | BODY MASS INDEX: 30.54 KG/M2 | DIASTOLIC BLOOD PRESSURE: 70 MMHG

## 2024-03-13 DIAGNOSIS — Z3A.28 28 WEEKS GESTATION OF PREGNANCY: Primary | ICD-10-CM

## 2024-03-13 DIAGNOSIS — Z34.83 PRENATAL CARE, SUBSEQUENT PREGNANCY, THIRD TRIMESTER: ICD-10-CM

## 2024-03-13 LAB
GLUCOSE UR STRIP-MCNC: NEGATIVE MG/DL
PROT UR STRIP-MCNC: NEGATIVE MG/DL

## 2024-03-13 PROCEDURE — 0502F SUBSEQUENT PRENATAL CARE: CPT | Performed by: OBSTETRICS & GYNECOLOGY

## 2024-03-13 NOTE — PROGRESS NOTES
Has not eaten today so will come back for 1 hour GTT tomorrow. GFM. Denies LOF, ctx, VB.   /70   Wt 75.8 kg (167 lb)   LMP 08/30/2023   BMI 30.54 kg/m²   FHTs 136  Urine protein and glucose negative    Diagnoses and all orders for this visit:    1. 28 weeks gestation of pregnancy (Primary)  -     POC Urinalysis Dipstick  -     Gestational Screen 1 Hr ([facility])  -     Hemoglobin  -     RPR  IUP at 28 weeks gestation. 1 hour GTT tomorrow. Tdap next visit. RTC 2 weeks.   2. Prenatal care, subsequent pregnancy, third trimester

## 2024-03-15 LAB
GLUCOSE 1H P 50 G GLC PO SERPL-MCNC: 91 MG/DL (ref 65–139)
HGB BLD-MCNC: 10 G/DL (ref 12–15.9)
RPR SER QL: NON REACTIVE

## 2024-03-20 ENCOUNTER — TELEPHONE (OUTPATIENT)
Dept: OBSTETRICS AND GYNECOLOGY | Age: 34
End: 2024-03-20

## 2024-03-20 NOTE — TELEPHONE ENCOUNTER
"[facility] staff attempted to follow warm transfer process and was unsuccessful     Caller: Jessy Melchor \"NEFTALI\"    Relationship to patient: Self    Best call back number: [phone] / LVM    Patient is needing: PT HAS NEXT OB F/U APPT ON 03/27/24 @ 8:45am - PT IS NEEDING TO KNOW IF SHE CAN COME IN A LITTLE LATER AROUND 9:15 OR 9:30 - PT HAS TO TAKE CHILD TO DAY CARE THAT MORNING    PLEASE CALL THE PT TO LET HER KNOW - [facility] COULD NOT FIND ANYTHING AVAILABLE WITH JENNIFER KELLEY OR DR WRAY    [facility] UNABLE TO WT TO OFFICE      "

## 2024-03-27 ENCOUNTER — ROUTINE PRENATAL (OUTPATIENT)
Age: 34
End: 2024-03-27
Payer: COMMERCIAL

## 2024-03-27 VITALS — BODY MASS INDEX: 30.9 KG/M2 | SYSTOLIC BLOOD PRESSURE: 118 MMHG | DIASTOLIC BLOOD PRESSURE: 80 MMHG | WEIGHT: 169 LBS

## 2024-03-27 DIAGNOSIS — Z3A.30 30 WEEKS GESTATION OF PREGNANCY: Primary | ICD-10-CM

## 2024-03-27 DIAGNOSIS — O09.523 MULTIGRAVIDA OF ADVANCED MATERNAL AGE IN THIRD TRIMESTER: ICD-10-CM

## 2024-03-27 DIAGNOSIS — Z71.85 IMMUNIZATION COUNSELING: ICD-10-CM

## 2024-03-27 DIAGNOSIS — Z23 NEED FOR TDAP VACCINATION: ICD-10-CM

## 2024-03-27 PROBLEM — O99.210 OBESITY AFFECTING PREGNANCY, ANTEPARTUM: Status: ACTIVE | Noted: 2024-01-16

## 2024-03-27 PROBLEM — O09.529 AMA (ADVANCED MATERNAL AGE) MULTIGRAVIDA 35+, UNSPECIFIED TRIMESTER: Status: ACTIVE | Noted: 2024-01-11

## 2024-03-27 LAB
GLUCOSE UR STRIP-MCNC: NEGATIVE MG/DL
PROT UR STRIP-MCNC: NEGATIVE MG/DL

## 2024-03-27 PROCEDURE — 90471 IMMUNIZATION ADMIN: CPT | Performed by: ADVANCED PRACTICE MIDWIFE

## 2024-03-27 PROCEDURE — 90715 TDAP VACCINE 7 YRS/> IM: CPT | Performed by: ADVANCED PRACTICE MIDWIFE

## 2024-03-27 PROCEDURE — 0502F SUBSEQUENT PRENATAL CARE: CPT | Performed by: ADVANCED PRACTICE MIDWIFE

## 2024-03-27 NOTE — PROGRESS NOTES
Reason for visit: Routine OB visit at 30w1d     CC:  Urinary frequency at night, but overall feels well. Endorses good fetal movement. Denies VB, LOF, contractions, h/a, visual changes, and right upper quadrant pain.     ROS: All systems reviewed and are negative with exception of the following: urinary frequency, BH contractions    Wt 169 lb for a TWG of 0.454 kg (1 lb), /80, FHTs 146, FH 31 cm  Urine today and reviewed: negative glucose, negative protein    20-week Anatomy scan: EFW 75%; normal; posterior placenta without evidence of placenta previa    Exam:  General Appearance:  No visualized signs of distress. Normal mood and behavior  Cardiorespiratory: HR str and reg. Lungs clear. Resp even and unlabored.  Abdomen: is soft and nontender. No CVA tenderness. Uterus is consistent with estimated gestational age  Ext: No edema. Calves non-tender.       Impression  Diagnoses and all orders for this visit:    1. 30 weeks gestation of pregnancy (Primary)  She is planning to breast feed. Will have staff check to see if order has been signed and sent. She also plans for Dr. Che to be the pediatrician upon delivery. Discussed and encouraged practicing measures of relaxation during the birthing experience. Discussed the use of yoga in pregnancy.  -     POC Urinalysis Dipstick  -     Tdap Vaccine => 6yo IM (BOOSTRIX)    2. Multigravida of advanced maternal age in third trimester  Discussed third trimester of pregnancy, discomforts and measures of support, fetal movement counts,  labor warnings, preeclampsia warnings, and signs and symptoms to report. Reviewed glucose tolerance test and hemoglobin results. Her prenatal vitamin has iron in it. Encouraged sources of iron in her diet. Patient to notify provider and/or come to the hospital with vaginal bleeding, leaking of fluid, contraction, or other concerns. Third Trimester of Pregnancy, Signs and Symptoms of Labor, and Alternative Pain Management During Labor  and Delivery videos included in the AVS.    3. Immunization counseling  Reviewed Tdap immunization recommendations during pregnancy. Consents to receive the Tdap immunization during this prenatal visit. Tdap (Tetanus, Diptheria, Pertussis) Vaccine: What You Need to Know (VIS) education included in the AVS.    4. Need for Tdap vaccination  -     Tdap Vaccine => 8yo IM (BOOSTRIX)          Return to the office in 2 weeks for routine prenatal visit with Dr. Torres with 4D and interval growth ultrasound for AMA, BMI, and anemia in pregnancy. Or, return sooner as needed with concerns.        This note has been signed electronically.    Sailaja Ga, DNP, APRN, CNM, RNC-OB

## 2024-04-16 ENCOUNTER — ROUTINE PRENATAL (OUTPATIENT)
Age: 34
End: 2024-04-16
Payer: COMMERCIAL

## 2024-04-16 VITALS — WEIGHT: 172 LBS | SYSTOLIC BLOOD PRESSURE: 126 MMHG | DIASTOLIC BLOOD PRESSURE: 78 MMHG | BODY MASS INDEX: 31.45 KG/M2

## 2024-04-16 DIAGNOSIS — Z34.83 PRENATAL CARE, SUBSEQUENT PREGNANCY, THIRD TRIMESTER: ICD-10-CM

## 2024-04-16 DIAGNOSIS — Z3A.33 33 WEEKS GESTATION OF PREGNANCY: Primary | ICD-10-CM

## 2024-04-16 LAB
GLUCOSE UR STRIP-MCNC: NEGATIVE MG/DL
PROT UR STRIP-MCNC: ABNORMAL MG/DL

## 2024-04-16 PROCEDURE — 0502F SUBSEQUENT PRENATAL CARE: CPT | Performed by: OBSTETRICS & GYNECOLOGY

## 2024-04-16 RX ORDER — FERROUS SULFATE 325(65) MG
325 TABLET ORAL
Qty: 30 TABLET | Refills: 12 | Status: SHIPPED | OUTPATIENT
Start: 2024-04-16

## 2024-04-16 NOTE — PROGRESS NOTES
No complaints. Reports GFM. Denies VB, LOF, ctx. US today for growth: breech, normal TONY, normal growth.      /78   Wt 78 kg (172 lb)   LMP 08/30/2023   BMI 31.45 kg/m²    FHTs 125  Urine protein trace, urine glucose negative    Diagnoses and all orders for this visit:    1. 33 weeks gestation of pregnancy (Primary)  -     POC Urinalysis Dipstick    2. Prenatal care, subsequent pregnancy, third trimester  IUP at 33 weeks gestation for return ob visit. US today for growth due to BMI.  Growth normal, however baby currently breech. Will recheck position at 36 weeks. RTC 2 weeks. PTL precautions given. Hg 10, added FeSO4 daily.   Other orders  -     ferrous sulfate 325 (65 FE) MG tablet; Take 1 tablet by mouth Daily With Breakfast.  Dispense: 30 tablet; Refill: 12

## 2024-04-30 ENCOUNTER — ROUTINE PRENATAL (OUTPATIENT)
Age: 34
End: 2024-04-30
Payer: COMMERCIAL

## 2024-04-30 VITALS — SYSTOLIC BLOOD PRESSURE: 128 MMHG | BODY MASS INDEX: 31.63 KG/M2 | WEIGHT: 173 LBS | DIASTOLIC BLOOD PRESSURE: 70 MMHG

## 2024-04-30 DIAGNOSIS — O09.523 MULTIGRAVIDA OF ADVANCED MATERNAL AGE IN THIRD TRIMESTER: ICD-10-CM

## 2024-04-30 DIAGNOSIS — Z3A.35 35 WEEKS GESTATION OF PREGNANCY: Primary | ICD-10-CM

## 2024-04-30 LAB
GLUCOSE UR STRIP-MCNC: NEGATIVE MG/DL
PROT UR STRIP-MCNC: NEGATIVE MG/DL

## 2024-05-07 ENCOUNTER — ROUTINE PRENATAL (OUTPATIENT)
Age: 34
End: 2024-05-07
Payer: COMMERCIAL

## 2024-05-07 VITALS — WEIGHT: 172.5 LBS | DIASTOLIC BLOOD PRESSURE: 78 MMHG | SYSTOLIC BLOOD PRESSURE: 122 MMHG | BODY MASS INDEX: 31.54 KG/M2

## 2024-05-07 DIAGNOSIS — Z3A.36 36 WEEKS GESTATION OF PREGNANCY: Primary | ICD-10-CM

## 2024-05-07 LAB
GLUCOSE UR STRIP-MCNC: NEGATIVE MG/DL
PROT UR STRIP-MCNC: ABNORMAL MG/DL

## 2024-05-07 PROCEDURE — 0502F SUBSEQUENT PRENATAL CARE: CPT | Performed by: OBSTETRICS & GYNECOLOGY

## 2024-05-09 LAB — GP B STREP DNA SPEC QL NAA+PROBE: NEGATIVE

## 2024-05-10 LAB
C TRACH RRNA SPEC QL NAA+PROBE: NEGATIVE
N GONORRHOEA RRNA SPEC QL NAA+PROBE: NEGATIVE

## 2024-05-14 ENCOUNTER — ROUTINE PRENATAL (OUTPATIENT)
Age: 34
End: 2024-05-14
Payer: COMMERCIAL

## 2024-05-14 VITALS — SYSTOLIC BLOOD PRESSURE: 134 MMHG | DIASTOLIC BLOOD PRESSURE: 84 MMHG | WEIGHT: 174 LBS | BODY MASS INDEX: 31.82 KG/M2

## 2024-05-14 DIAGNOSIS — Z36.89 DETERMINE FETAL PRESENTATION USING ULTRASOUND: ICD-10-CM

## 2024-05-14 DIAGNOSIS — Z3A.37 37 WEEKS GESTATION OF PREGNANCY: Primary | ICD-10-CM

## 2024-05-14 LAB
GLUCOSE UR STRIP-MCNC: NEGATIVE MG/DL
PROT UR STRIP-MCNC: ABNORMAL MG/DL

## 2024-05-14 PROCEDURE — 0502F SUBSEQUENT PRENATAL CARE: CPT | Performed by: OBSTETRICS & GYNECOLOGY

## 2024-05-14 NOTE — PROGRESS NOTES
No complaints. Has been trying spinning babies. US today with baby still amanda breech, normal TONY. Pt interested in external version, however wishes to wait until next week.     /84   Wt 78.9 kg (174 lb)   LMP 08/30/2023   BMI 31.82 kg/m²    FHTs 147  Urine protein trace, urine glucose negative  US: amanda breech, normal TONY    Diagnoses and all orders for this visit:    1. 37 weeks gestation of pregnancy (Primary)  -     POC Urinalysis Dipstick    2. Determine fetal presentation using ultrasound    3. Maternal care for breech presentation, single gestation  IUP at 37 weeks gestation doing well. Breech presentation. Pt interested in version, however would like to wait until next week. RTC 1 week.

## 2024-05-21 ENCOUNTER — HOSPITAL ENCOUNTER (OUTPATIENT)
Facility: HOSPITAL | Age: 34
Discharge: HOME | End: 2024-05-21
Attending: OBSTETRICS & GYNECOLOGY | Admitting: OBSTETRICS & GYNECOLOGY
Payer: COMMERCIAL

## 2024-05-21 ENCOUNTER — ROUTINE PRENATAL (OUTPATIENT)
Age: 34
End: 2024-05-21
Payer: COMMERCIAL

## 2024-05-21 VITALS
TEMPERATURE: 98.6 F | SYSTOLIC BLOOD PRESSURE: 127 MMHG | HEART RATE: 93 BPM | DIASTOLIC BLOOD PRESSURE: 76 MMHG | RESPIRATION RATE: 18 BRPM

## 2024-05-21 VITALS — SYSTOLIC BLOOD PRESSURE: 134 MMHG | WEIGHT: 175 LBS | DIASTOLIC BLOOD PRESSURE: 88 MMHG | BODY MASS INDEX: 32 KG/M2

## 2024-05-21 DIAGNOSIS — Z3A.38 38 WEEKS GESTATION OF PREGNANCY: Primary | ICD-10-CM

## 2024-05-21 LAB
ABO GROUP BLD: NORMAL
BASOPHILS # BLD AUTO: 0.04 10*3/MM3 (ref 0–0.2)
BASOPHILS NFR BLD AUTO: 0.5 % (ref 0–1.5)
BLD GP AB SCN SERPL QL: NEGATIVE
DEPRECATED RDW RBC AUTO: 39 FL (ref 37–54)
EOSINOPHIL # BLD AUTO: 0.06 10*3/MM3 (ref 0–0.4)
EOSINOPHIL NFR BLD AUTO: 0.7 % (ref 0.3–6.2)
ERYTHROCYTE [DISTWIDTH] IN BLOOD BY AUTOMATED COUNT: 13.6 % (ref 12.3–15.4)
GLUCOSE UR STRIP-MCNC: NEGATIVE MG/DL
HCT VFR BLD AUTO: 26.4 % (ref 34–46.6)
HGB BLD-MCNC: 8.3 G/DL (ref 12–15.9)
IMM GRANULOCYTES # BLD AUTO: 0.17 10*3/MM3 (ref 0–0.05)
IMM GRANULOCYTES NFR BLD AUTO: 2 % (ref 0–0.5)
LYMPHOCYTES # BLD AUTO: 1.91 10*3/MM3 (ref 0.7–3.1)
LYMPHOCYTES NFR BLD AUTO: 22.3 % (ref 19.6–45.3)
MCH RBC QN AUTO: 25.2 PG (ref 26.6–33)
MCHC RBC AUTO-ENTMCNC: 31.4 G/DL (ref 31.5–35.7)
MCV RBC AUTO: 80.2 FL (ref 79–97)
MONOCYTES # BLD AUTO: 0.56 10*3/MM3 (ref 0.1–0.9)
MONOCYTES NFR BLD AUTO: 6.5 % (ref 5–12)
NEUTROPHILS NFR BLD AUTO: 5.84 10*3/MM3 (ref 1.7–7)
NEUTROPHILS NFR BLD AUTO: 68 % (ref 42.7–76)
NRBC BLD AUTO-RTO: 0 /100 WBC (ref 0–0.2)
PLATELET # BLD AUTO: 326 10*3/MM3 (ref 140–450)
PMV BLD AUTO: 10.5 FL (ref 6–12)
PROT UR STRIP-MCNC: ABNORMAL MG/DL
RBC # BLD AUTO: 3.29 10*6/MM3 (ref 3.77–5.28)
RH BLD: POSITIVE
T PALLIDUM IGG SER QL: NORMAL
T&S EXPIRATION DATE: NORMAL
WBC NRBC COR # BLD AUTO: 8.58 10*3/MM3 (ref 3.4–10.8)

## 2024-05-21 PROCEDURE — 85025 COMPLETE CBC W/AUTO DIFF WBC: CPT | Performed by: OBSTETRICS & GYNECOLOGY

## 2024-05-21 PROCEDURE — G0463 HOSPITAL OUTPT CLINIC VISIT: HCPCS

## 2024-05-21 PROCEDURE — 86780 TREPONEMA PALLIDUM: CPT | Performed by: OBSTETRICS & GYNECOLOGY

## 2024-05-21 PROCEDURE — 59412 ANTEPARTUM MANIPULATION: CPT

## 2024-05-21 PROCEDURE — 59412 ANTEPARTUM MANIPULATION: CPT | Performed by: OBSTETRICS & GYNECOLOGY

## 2024-05-21 PROCEDURE — G0378 HOSPITAL OBSERVATION PER HR: HCPCS

## 2024-05-21 PROCEDURE — 86900 BLOOD TYPING SEROLOGIC ABO: CPT | Performed by: OBSTETRICS & GYNECOLOGY

## 2024-05-21 PROCEDURE — 86901 BLOOD TYPING SEROLOGIC RH(D): CPT | Performed by: OBSTETRICS & GYNECOLOGY

## 2024-05-21 PROCEDURE — 86850 RBC ANTIBODY SCREEN: CPT | Performed by: OBSTETRICS & GYNECOLOGY

## 2024-05-21 RX ORDER — SODIUM CHLORIDE 0.9 % (FLUSH) 0.9 %
10 SYRINGE (ML) INJECTION AS NEEDED
Status: DISCONTINUED | OUTPATIENT
Start: 2024-05-21 | End: 2024-05-21 | Stop reason: HOSPADM

## 2024-05-21 RX ORDER — SODIUM CHLORIDE 0.9 % (FLUSH) 0.9 %
10 SYRINGE (ML) INJECTION EVERY 12 HOURS SCHEDULED
Status: DISCONTINUED | OUTPATIENT
Start: 2024-05-21 | End: 2024-05-21 | Stop reason: HOSPADM

## 2024-05-21 NOTE — PROGRESS NOTES
US today with Breech presentation. Pt desire external version. GFM. Denies VB, LOF, ctx. TONY normal on US today.     /88   Wt 79.4 kg (175 lb)   LMP 08/30/2023   BMI 32.00 kg/m²    FHTs 144  Urine protein 1+, urine glucose negative    Diagnoses and all orders for this visit:    1. 38 weeks gestation of pregnancy (Primary)  -     POC Urinalysis Dipstick  IUP at 38 weeks gestation. Breech presentation. To OB for external version. Instructions given to pt.   2. Maternal care for breech presentation, single gestation

## 2024-05-21 NOTE — H&P
[facility]  Jessy Melchor  : 1990  MRN: [id number]  CSN: [id number]    History and Physical    Subjective   Jessy Melchor is a 34 y.o. year old  with an Estimated Date of Delivery: 24 currently at 38w0d presenting with breech presentation for external cephalic version. Pt has tried other methods without change in presentation. Would like to proceed with external cephalic version today.    Prenatal care has been with Dr. Sis Torres.  It has been benign.    OB History    Para Term  AB Living   3 2 2 0 0 2   SAB IAB Ectopic Molar Multiple Live Births   0 0 0 0 0 0      # Outcome Date GA Lbr Kadeem/2nd Weight Sex Type Anes PTL Lv   3 Current            2 Term 21    F Vag-Spont      1 Term 19    F Vag-Spont        Past Medical History:   Diagnosis Date    Kidney stone     x 2 hospitalized    Pelvic prolapse     Slight bladder prolapse after 2nd child ‘21    UTI (urinary tract infection)      Past Surgical History:   Procedure Laterality Date    URETEROSCOPY LASER LITHOTRIPSY WITH STENT INSERTION Right 2022    Procedure: RIGHT URETEROSCOPY LASER LITHOTRIPSY WITH STENT INSERTION;  Surgeon: Alistair Arellano MD;  Location: [facility];  Service: Urology;  Laterality: Right;    WISDOM TOOTH EXTRACTION         Current Facility-Administered Medications:     sodium chloride 0.9 % flush 10 mL, 10 mL, Intravenous, Q12H, Sis Torres MD    sodium chloride 0.9 % flush 10 mL, 10 mL, Intravenous, PRN, Sis Torres MD    No Known Allergies  Social History    Tobacco Use      Smoking status: Never      Smokeless tobacco: Never    Review of Systems      Objective   /74   Pulse 82   Temp 98.6 °F (37 °C) (Temporal)   Resp 18   LMP 2023   General: well developed; well nourished  no acute distress   Heart: regular rate and rhythm, S1, S2 normal, no murmur, click, rub or gallop   Lungs: breathing is unlabored   Abdomen: soft, non-tender; no masses  no  umbilical or inguinal hernias are present  no hepato-splenomegaly   FHT's: reactive   Cervix: was not checked.   Presentation: breech   Contractions:  EFW by Leopold's:  EFW by recent u/s: none           Prenatal Labs  Lab Results   Component Value Date    HGB 8.3 (L) 05/21/2024    HEPBSAG Negative 11/08/2023    ABO O 05/21/2024    RH Positive 05/21/2024    ABSCRN Negative 05/21/2024    [id number] Non Reactive 11/08/2023    URINECX No growth 01/22/2024       Current Labs Reviewed   No data reviewed       Assessment   IUP at 38w0d in breech presentation  Fetus reassuring  Group B strep status: negative  Breech     Plan   Plan for external cephalic version today.     Sis Torres MD  5/21/2024  12:07 CDT

## 2024-05-21 NOTE — DISCHARGE INSTRUCTIONS
Education provided to patient on reasons to return to labor and delivery. Including increased frequency/intensity of contractions, sudden gush/leaking of fluid, vaginal bleeding, and decreased fetal movement.

## 2024-05-21 NOTE — OP NOTE
Procedure Note:    35 y/o CF sent from clinic due to breech presentation for external cephalic version. Consent signed. FHTs reactive. Under US guidance, external version done. Pt tolerated well. Baby back up amanda breech and moved to cephalic. FHTs reactive after successful version. Will keep pt for prolonged monitoring.

## 2024-05-22 NOTE — PAYOR COMM NOTE
"REF:[id number]  OBSERVATION 5/21/2024 AND DISCHARGED ON 5/21/2024    [facility]  [place],   [phone]  OR  FAX   [phone]    Jessy Melvin \"NEFTALI\" (34 y.o. Female)       Date of Birth   1990    Social Security Number       Address   [address] [address]    Home Phone   [phone]    MRN   [id number]       Muslim   Moravian    Marital Status                               Admission Date   5/21/24    Admission Type   Elective    Admitting Provider   Sis Torres MD    Attending Provider       Department, Room/Bed   [facility] LABOR DELIVERY, LPA3/1       Discharge Date   5/21/2024    Discharge Disposition   Home or Self Care    Discharge Destination                                 Attending Provider: (none)   Allergies: No Known Allergies    Isolation: None   Infection: None   Code Status: Not on file    Ht: 157.5 cm (62.01\")   Wt: 79.4 kg (175 lb)    Admission Cmt: None   Principal Problem: Pregnancy [Z34.90]                   Active Insurance as of 5/21/2024       Primary Coverage       Payor Plan Insurance Group Employer/Plan Group    [employer] H[employer] [id number]       Payor Plan Address Payor Plan Phone Number Payor Plan Fax Number Effective Dates    [address] [phone]  1/1/2024 - None Entered    [place] [id number]         Subscriber Name Subscriber Birth Date Member ID       MARIE MELVIN Q 4/18/1988 [id number]                     Emergency Contacts        (Rel.) Home Phone Work Phone Mobile Phone    MARIE MELVIN (Spouse) [phone] -- --          Moon Altamirano, RN   Registered Nurse  Obstetrics     Nursing Note     Signed     Date of Service: 05/21/24 1223  Creation Time: 05/21/24 1238     Signed         [id number] MD at bedside for external cephalic version. Ultrasound at bedside in use              Moon lAtamirano, RN   Registered Nurse  Obstetrics     Nursing Note   "   Signed     Date of Service: 24  Creation Time: 24 1623     Signed         Education provided to patient on reasons to return to labor and delivery. Including increased frequency/intensity of contractions, sudden gush/leaking of fluid, vaginal bleeding, and decreased fetal movement.                  History & Physical        Sis Torres MD at 24 1207          [facility]  Jessy Melchor  : 1990  MRN: [id number]  CSN: [id number]    History and Physical    Subjective  Jessy Melchor is a 34 y.o. year old  with an Estimated Date of Delivery: 24 currently at 38w0d presenting with breech presentation for external cephalic version. Pt has tried other methods without change in presentation. Would like to proceed with external cephalic version today.    Prenatal care has been with Dr. Sis Torres.  It has been benign.    OB History    Para Term  AB Living   3 2 2 0 0 2   SAB IAB Ectopic Molar Multiple Live Births   0 0 0 0 0 0      # Outcome Date GA Lbr Kadeem/2nd Weight Sex Type Anes PTL Lv   3 Current            2 Term 21    F Vag-Spont      1 Term 19    F Vag-Spont        Past Medical History:   Diagnosis Date    Kidney stone     x 2 hospitalized    Pelvic prolapse     Slight bladder prolapse after 2nd child     UTI (urinary tract infection)      Past Surgical History:   Procedure Laterality Date    URETEROSCOPY LASER LITHOTRIPSY WITH STENT INSERTION Right 2022    Procedure: RIGHT URETEROSCOPY LASER LITHOTRIPSY WITH STENT INSERTION;  Surgeon: Alistair Arellano MD;  Location: [facility];  Service: Urology;  Laterality: Right;    WISDOM TOOTH EXTRACTION         Current Facility-Administered Medications:     sodium chloride 0.9 % flush 10 mL, 10 mL, Intravenous, Q12H, Sis Torres MD    sodium chloride 0.9 % flush 10 mL, 10 mL, Intravenous, PRN, Sis Torres MD    No Known Allergies  Social History    Tobacco Use       Smoking status: Never      Smokeless tobacco: Never    Review of Systems      Objective  /74   Pulse 82   Temp 98.6 °F (37 °C) (Temporal)   Resp 18   LMP 08/30/2023   General: well developed; well nourished  no acute distress   Heart: regular rate and rhythm, S1, S2 normal, no murmur, click, rub or gallop   Lungs: breathing is unlabored   Abdomen: soft, non-tender; no masses  no umbilical or inguinal hernias are present  no hepato-splenomegaly   FHT's: reactive   Cervix: was not checked.   Presentation: breech   Contractions:  EFW by Leopold's:  EFW by recent u/s: none           Prenatal Labs  Lab Results   Component Value Date    HGB 8.3 (L) 05/21/2024    HEPBSAG Negative 11/08/2023    ABO O 05/21/2024    RH Positive 05/21/2024    ABSCRN Negative 05/21/2024    [id number] Non Reactive 11/08/2023    URINECX No growth 01/22/2024       Current Labs Reviewed   No data reviewed       Assessment  IUP at 38w0d in breech presentation  Fetus reassuring  Group B strep status: negative  Breech     Plan  Plan for external cephalic version today.     Sis Torres MD  5/21/2024  12:07 CDT        Electronically signed by Sis Torres MD at 05/21/24 1210       Vital Signs (last 2 days) before discharge       Date/Time Temp Temp src Pulse Resp BP Patient Position    05/21/24 1601 -- -- 93 -- 127/76 --    05/21/24 1505 -- -- 91 -- 123/75 --    05/21/24 1401 -- -- 88 -- 119/62 --    05/21/24 1301 -- -- 97 -- 138/80 --    05/21/24 1241 -- -- 75 -- 119/60 Sitting    05/21/24 1235 -- -- 88 -- 131/79 --    05/21/24 1233 -- -- 95 -- 158/88 --    05/21/24 1041 -- -- 82 -- 122/74 --    05/21/24 1015 98.6 (37) Temporal 88 18 129/83 Lying    05/21/24 1014 -- -- 91 -- 142/89 --          Oxygen Therapy (last 2 days) before discharge       None          Intake & Output (last 2 days)         05/20 0701 05/21 0700 05/21 0701 05/22 0700 05/22 0701 05/23 0700           Urine Unmeasured Occurrence  1 x           No current  facility-administered medications on file as of 5/21/2024.     Orders (last 48 hrs)        Start     Ordered    05/21/24 1626  Discharge patient  Once         05/21/24 1626    05/21/24 1530  Okay to discharge at 1630  [facility] [place] Order (Specify)  Once,   Status:  Canceled        Comments: Okay to discharge at 1630    05/21/24 1550    05/21/24 1235  Call MD to update on pt status in 4 hours  Misc Nursing Order (Specify)  Once,   Status:  Canceled        Comments: Call MD to update on pt status in 4 hours    05/21/24 1235    05/21/24 1235  Diet: Regular/House; Fluid Consistency: Thin (IDDSI 0)  Diet Effective Now,   Status:  Canceled         05/21/24 1235    05/21/24 1130  sodium chloride 0.9 % flush 10 mL  Every 12 Hours Scheduled,   Status:  Discontinued         05/21/24 1033    05/21/24 1034  Insert Peripheral IV  Once,   Status:  Canceled         05/21/24 1033    05/21/24 1034  PERIPHERAL IV CARE - Connectors / Hubs Must Be Scrubbed 15 Seconds Using 70% Alcohol & Allowed to Dry Before Accessing Line  Continuous,   Status:  Canceled         05/21/24 1033    05/21/24 1033  sodium chloride 0.9 % flush 10 mL  As Needed,   Status:  Discontinued         05/21/24 1033    05/21/24 1033  Change Peripheral IV Site  As Needed,   Status:  Canceled      Comments: Frequency Per Facility Policy    05/21/24 1033    05/21/24 1033  PERIPHERAL IV CARE - Change Dressing As Needed When Damp, Loose or Soiled  As Needed,   Status:  Canceled       05/21/24 1033    05/21/24 1033  T Pallidum Antibody w/ reflex RPR (Syphilis)  STAT         05/21/24 1032    05/21/24 1032  Initiate Observation Status  Once         05/21/24 1032    05/21/24 1032  Type & Screen  STAT         05/21/24 1032    05/21/24 1032  CBC & Differential  STAT         05/21/24 1032    05/21/24 1032  CBC Auto Differential  PROCEDURE ONCE         05/21/24 1032                     Operative/Procedure Notes (last 48 hours)        Sis Torres MD at 05/21/24 1232           Procedure Note:    35 y/o CF sent from clinic due to breech presentation for external cephalic version. Consent signed. FHTs reactive. Under US guidance, external version done. Pt tolerated well. Baby back up amanda breech and moved to cephalic. FHTs reactive after successful version. Will keep pt for prolonged monitoring.     Electronically signed by Sis Torres MD at 05/21/24 1240       Physician Progress Notes (last 48 hours)  Notes from 05/20/24 1207 through 05/22/24 1207   No notes of this type exist for this encounter.

## 2024-05-28 ENCOUNTER — ROUTINE PRENATAL (OUTPATIENT)
Age: 34
End: 2024-05-28
Payer: COMMERCIAL

## 2024-05-28 VITALS — BODY MASS INDEX: 31.4 KG/M2 | DIASTOLIC BLOOD PRESSURE: 88 MMHG | SYSTOLIC BLOOD PRESSURE: 138 MMHG | WEIGHT: 171.7 LBS

## 2024-05-28 DIAGNOSIS — Z3A.39 39 WEEKS GESTATION OF PREGNANCY: Primary | ICD-10-CM

## 2024-05-28 LAB
GLUCOSE UR STRIP-MCNC: NEGATIVE MG/DL
PROT UR STRIP-MCNC: ABNORMAL MG/DL

## 2024-05-28 PROCEDURE — 0502F SUBSEQUENT PRENATAL CARE: CPT | Performed by: OBSTETRICS & GYNECOLOGY

## 2024-05-28 NOTE — PROGRESS NOTES
Underwent external version last week. US at bedside this am, vertex. GFM. Denies VB, LOF, ctx. Denies PIH symptoms.     /88   Wt 77.9 kg (171 lb 11.2 oz)   LMP 08/30/2023   BMI 31.40 kg/m²    FHTs 152  Urine protein 1+, Urine glucose negative  Defers cervical exam    Diagnoses and all orders for this visit:    1. 39 weeks gestation of pregnancy (Primary)  -     POC Urinalysis Dipstick  IUP at 39 weeks gestation, now vertex after external version. Declines cervical exam as no symptoms of labor. RTC weekly. L&D precautions.

## 2024-06-04 ENCOUNTER — ROUTINE PRENATAL (OUTPATIENT)
Age: 34
End: 2024-06-04
Payer: COMMERCIAL

## 2024-06-04 ENCOUNTER — HOSPITAL ENCOUNTER (OUTPATIENT)
Facility: HOSPITAL | Age: 34
Discharge: HOME | End: 2024-06-04
Attending: OBSTETRICS & GYNECOLOGY | Admitting: OBSTETRICS & GYNECOLOGY
Payer: COMMERCIAL

## 2024-06-04 VITALS
OXYGEN SATURATION: 99 % | HEART RATE: 93 BPM | TEMPERATURE: 98.3 F | RESPIRATION RATE: 18 BRPM | DIASTOLIC BLOOD PRESSURE: 79 MMHG | SYSTOLIC BLOOD PRESSURE: 120 MMHG

## 2024-06-04 VITALS — DIASTOLIC BLOOD PRESSURE: 94 MMHG | WEIGHT: 173.5 LBS | BODY MASS INDEX: 31.73 KG/M2 | SYSTOLIC BLOOD PRESSURE: 146 MMHG

## 2024-06-04 DIAGNOSIS — Z3A.40 40 WEEKS GESTATION OF PREGNANCY: Primary | ICD-10-CM

## 2024-06-04 DIAGNOSIS — O13.3 GESTATIONAL HYPERTENSION, THIRD TRIMESTER: ICD-10-CM

## 2024-06-04 LAB
ALBUMIN SERPL-MCNC: 3.4 G/DL (ref 3.5–5.2)
ALBUMIN/GLOB SERPL: 1.3 G/DL
ALP SERPL-CCNC: 142 U/L (ref 39–117)
ALT SERPL W P-5'-P-CCNC: 7 U/L (ref 1–33)
ANION GAP SERPL CALCULATED.3IONS-SCNC: 14 MMOL/L (ref 5–15)
AST SERPL-CCNC: 14 U/L (ref 1–32)
BASOPHILS # BLD AUTO: 0.02 10*3/MM3 (ref 0–0.2)
BASOPHILS NFR BLD AUTO: 0.2 % (ref 0–1.5)
BILIRUB SERPL-MCNC: 0.2 MG/DL (ref 0–1.2)
BUN SERPL-MCNC: 6 MG/DL (ref 6–20)
BUN/CREAT SERPL: 13.6 (ref 7–25)
CALCIUM SPEC-SCNC: 7.5 MG/DL (ref 8.6–10.5)
CHLORIDE SERPL-SCNC: 104 MMOL/L (ref 98–107)
CO2 SERPL-SCNC: 18 MMOL/L (ref 22–29)
CREAT SERPL-MCNC: 0.44 MG/DL (ref 0.57–1)
CREAT UR-MCNC: 71.1 MG/DL
DEPRECATED RDW RBC AUTO: 39.8 FL (ref 37–54)
EGFRCR SERPLBLD CKD-EPI 2021: 129.5 ML/MIN/1.73
EOSINOPHIL # BLD AUTO: 0.05 10*3/MM3 (ref 0–0.4)
EOSINOPHIL NFR BLD AUTO: 0.6 % (ref 0.3–6.2)
ERYTHROCYTE [DISTWIDTH] IN BLOOD BY AUTOMATED COUNT: 14.1 % (ref 12.3–15.4)
GLOBULIN UR ELPH-MCNC: 2.6 GM/DL
GLUCOSE SERPL-MCNC: 86 MG/DL (ref 65–99)
GLUCOSE UR STRIP-MCNC: NEGATIVE MG/DL
HCT VFR BLD AUTO: 26.3 % (ref 34–46.6)
HGB BLD-MCNC: 8.4 G/DL (ref 12–15.9)
IMM GRANULOCYTES # BLD AUTO: 0.24 10*3/MM3 (ref 0–0.05)
IMM GRANULOCYTES NFR BLD AUTO: 2.8 % (ref 0–0.5)
LYMPHOCYTES # BLD AUTO: 2 10*3/MM3 (ref 0.7–3.1)
LYMPHOCYTES NFR BLD AUTO: 23 % (ref 19.6–45.3)
MCH RBC QN AUTO: 24.9 PG (ref 26.6–33)
MCHC RBC AUTO-ENTMCNC: 31.9 G/DL (ref 31.5–35.7)
MCV RBC AUTO: 77.8 FL (ref 79–97)
MONOCYTES # BLD AUTO: 0.51 10*3/MM3 (ref 0.1–0.9)
MONOCYTES NFR BLD AUTO: 5.9 % (ref 5–12)
NEUTROPHILS NFR BLD AUTO: 5.87 10*3/MM3 (ref 1.7–7)
NEUTROPHILS NFR BLD AUTO: 67.5 % (ref 42.7–76)
NRBC BLD AUTO-RTO: 0 /100 WBC (ref 0–0.2)
PLATELET # BLD AUTO: 360 10*3/MM3 (ref 140–450)
PMV BLD AUTO: 10.3 FL (ref 6–12)
POTASSIUM SERPL-SCNC: 3.1 MMOL/L (ref 3.5–5.2)
PROT ?TM UR-MCNC: 20 MG/DL
PROT SERPL-MCNC: 6 G/DL (ref 6–8.5)
PROT UR STRIP-MCNC: ABNORMAL MG/DL
PROT/CREAT UR: 281.3 MG/G CREA (ref 0–200)
RBC # BLD AUTO: 3.38 10*6/MM3 (ref 3.77–5.28)
SODIUM SERPL-SCNC: 136 MMOL/L (ref 136–145)
URATE SERPL-MCNC: 4 MG/DL (ref 2.4–5.7)
WBC NRBC COR # BLD AUTO: 8.69 10*3/MM3 (ref 3.4–10.8)

## 2024-06-04 PROCEDURE — 82570 ASSAY OF URINE CREATININE: CPT | Performed by: OBSTETRICS & GYNECOLOGY

## 2024-06-04 PROCEDURE — G0378 HOSPITAL OBSERVATION PER HR: HCPCS

## 2024-06-04 PROCEDURE — 85025 COMPLETE CBC W/AUTO DIFF WBC: CPT | Performed by: OBSTETRICS & GYNECOLOGY

## 2024-06-04 PROCEDURE — 0502F SUBSEQUENT PRENATAL CARE: CPT | Performed by: OBSTETRICS & GYNECOLOGY

## 2024-06-04 PROCEDURE — 84156 ASSAY OF PROTEIN URINE: CPT | Performed by: OBSTETRICS & GYNECOLOGY

## 2024-06-04 PROCEDURE — 84550 ASSAY OF BLOOD/URIC ACID: CPT | Performed by: OBSTETRICS & GYNECOLOGY

## 2024-06-04 PROCEDURE — 80053 COMPREHEN METABOLIC PANEL: CPT | Performed by: OBSTETRICS & GYNECOLOGY

## 2024-06-04 PROCEDURE — G0463 HOSPITAL OUTPT CLINIC VISIT: HCPCS

## 2024-06-04 NOTE — PROGRESS NOTES
No complaints. Heartburn this weekend. GFM. Denies VB, LOF, ctx. GFM.     /94   Wt 78.7 kg (173 lb 8 oz)   LMP 08/30/2023   BMI 31.73 kg/m²    Urine protein 1+, urine glucose negative  Cervix: loose 1/thick/-2 soft    Diagnoses and all orders for this visit:    1. 40 weeks gestation of pregnancy (Primary)  -     POC Urinalysis Dipstick    2. Gestational hypertension, third trimester  IUP at 40 weeks gestation with elevated BP and 1+ UP. Recommend delivery however pt would like to avoid induction of labor. We did a membrane sweeping in clinic today. To OB for PIH labs, BP checks, NST, PCR.  Pt understands if BP remains elevated or any further labs abnormalities, we will recommend delivery for gestational hypertension at term. Pt agrees.

## 2024-06-05 ENCOUNTER — HOSPITAL ENCOUNTER (INPATIENT)
Facility: HOSPITAL | Age: 34
LOS: 2 days | Discharge: HOME | End: 2024-06-07
Attending: OBSTETRICS & GYNECOLOGY | Admitting: OBSTETRICS & GYNECOLOGY
Payer: COMMERCIAL

## 2024-06-05 LAB
ABO GROUP BLD: NORMAL
BLD GP AB SCN SERPL QL: NEGATIVE
DEPRECATED RDW RBC AUTO: 39.9 FL (ref 37–54)
ERYTHROCYTE [DISTWIDTH] IN BLOOD BY AUTOMATED COUNT: 14.3 % (ref 12.3–15.4)
HCT VFR BLD AUTO: 26.9 % (ref 34–46.6)
HGB BLD-MCNC: 8.6 G/DL (ref 12–15.9)
MCH RBC QN AUTO: 24.9 PG (ref 26.6–33)
MCHC RBC AUTO-ENTMCNC: 32 G/DL (ref 31.5–35.7)
MCV RBC AUTO: 77.7 FL (ref 79–97)
PLATELET # BLD AUTO: 386 10*3/MM3 (ref 140–450)
PMV BLD AUTO: 10.2 FL (ref 6–12)
RBC # BLD AUTO: 3.46 10*6/MM3 (ref 3.77–5.28)
RH BLD: POSITIVE
T PALLIDUM IGG SER QL: NORMAL
T&S EXPIRATION DATE: NORMAL
WBC NRBC COR # BLD AUTO: 14.89 10*3/MM3 (ref 3.4–10.8)

## 2024-06-05 PROCEDURE — 86850 RBC ANTIBODY SCREEN: CPT | Performed by: OBSTETRICS & GYNECOLOGY

## 2024-06-05 PROCEDURE — 86901 BLOOD TYPING SEROLOGIC RH(D): CPT | Performed by: OBSTETRICS & GYNECOLOGY

## 2024-06-05 PROCEDURE — 59400 OBSTETRICAL CARE: CPT | Performed by: OBSTETRICS & GYNECOLOGY

## 2024-06-05 PROCEDURE — 86900 BLOOD TYPING SEROLOGIC ABO: CPT | Performed by: OBSTETRICS & GYNECOLOGY

## 2024-06-05 PROCEDURE — 86780 TREPONEMA PALLIDUM: CPT | Performed by: OBSTETRICS & GYNECOLOGY

## 2024-06-05 PROCEDURE — 85027 COMPLETE CBC AUTOMATED: CPT | Performed by: OBSTETRICS & GYNECOLOGY

## 2024-06-05 RX ORDER — FAMOTIDINE 20 MG/1
20 TABLET, FILM COATED ORAL ONCE AS NEEDED
OUTPATIENT
Start: 2024-06-05

## 2024-06-05 RX ORDER — PROMETHAZINE HYDROCHLORIDE 12.5 MG/1
12.5 SUPPOSITORY RECTAL EVERY 6 HOURS PRN
Status: DISCONTINUED | OUTPATIENT
Start: 2024-06-05 | End: 2024-06-05 | Stop reason: HOSPADM

## 2024-06-05 RX ORDER — PRENATAL VIT/IRON FUM/FOLIC AC 27MG-0.8MG
1 TABLET ORAL DAILY
Status: DISCONTINUED | OUTPATIENT
Start: 2024-06-05 | End: 2024-06-07 | Stop reason: HOSPADM

## 2024-06-05 RX ORDER — ONDANSETRON 4 MG/1
4 TABLET, ORALLY DISINTEGRATING ORAL EVERY 8 HOURS PRN
Status: DISCONTINUED | OUTPATIENT
Start: 2024-06-05 | End: 2024-06-07 | Stop reason: HOSPADM

## 2024-06-05 RX ORDER — MISOPROSTOL 200 UG/1
800 TABLET ORAL ONCE AS NEEDED
OUTPATIENT
Start: 2024-06-05

## 2024-06-05 RX ORDER — MISOPROSTOL 200 UG/1
600 TABLET ORAL ONCE AS NEEDED
Status: DISCONTINUED | OUTPATIENT
Start: 2024-06-05 | End: 2024-06-07 | Stop reason: HOSPADM

## 2024-06-05 RX ORDER — SODIUM CHLORIDE 9 MG/ML
40 INJECTION, SOLUTION INTRAVENOUS AS NEEDED
Status: DISCONTINUED | OUTPATIENT
Start: 2024-06-05 | End: 2024-06-05 | Stop reason: HOSPADM

## 2024-06-05 RX ORDER — ACETAMINOPHEN 325 MG/1
650 TABLET ORAL EVERY 4 HOURS PRN
Status: DISCONTINUED | OUTPATIENT
Start: 2024-06-05 | End: 2024-06-05 | Stop reason: HOSPADM

## 2024-06-05 RX ORDER — ONDANSETRON 2 MG/ML
4 INJECTION INTRAMUSCULAR; INTRAVENOUS EVERY 6 HOURS PRN
OUTPATIENT
Start: 2024-06-05

## 2024-06-05 RX ORDER — OXYTOCIN/0.9 % SODIUM CHLORIDE 30/500 ML
999 PLASTIC BAG, INJECTION (ML) INTRAVENOUS ONCE
Status: COMPLETED | OUTPATIENT
Start: 2024-06-05 | End: 2024-06-05

## 2024-06-05 RX ORDER — ONDANSETRON 4 MG/1
4 TABLET, ORALLY DISINTEGRATING ORAL EVERY 6 HOURS PRN
OUTPATIENT
Start: 2024-06-05

## 2024-06-05 RX ORDER — ONDANSETRON 2 MG/ML
4 INJECTION INTRAMUSCULAR; INTRAVENOUS EVERY 6 HOURS PRN
Status: DISCONTINUED | OUTPATIENT
Start: 2024-06-05 | End: 2024-06-05 | Stop reason: HOSPADM

## 2024-06-05 RX ORDER — IBUPROFEN 600 MG/1
600 TABLET ORAL EVERY 6 HOURS PRN
Status: DISCONTINUED | OUTPATIENT
Start: 2024-06-05 | End: 2024-06-07 | Stop reason: HOSPADM

## 2024-06-05 RX ORDER — ACETAMINOPHEN 325 MG/1
650 TABLET ORAL EVERY 6 HOURS PRN
Status: DISCONTINUED | OUTPATIENT
Start: 2024-06-05 | End: 2024-06-07 | Stop reason: HOSPADM

## 2024-06-05 RX ORDER — FAMOTIDINE 10 MG/ML
20 INJECTION, SOLUTION INTRAVENOUS ONCE AS NEEDED
OUTPATIENT
Start: 2024-06-05

## 2024-06-05 RX ORDER — PROMETHAZINE HYDROCHLORIDE 25 MG/1
25 TABLET ORAL EVERY 6 HOURS PRN
Status: DISCONTINUED | OUTPATIENT
Start: 2024-06-05 | End: 2024-06-07 | Stop reason: HOSPADM

## 2024-06-05 RX ORDER — ONDANSETRON 4 MG/1
4 TABLET, ORALLY DISINTEGRATING ORAL EVERY 6 HOURS PRN
Status: DISCONTINUED | OUTPATIENT
Start: 2024-06-05 | End: 2024-06-05 | Stop reason: HOSPADM

## 2024-06-05 RX ORDER — CALCIUM CARBONATE 500 MG/1
2 TABLET, CHEWABLE ORAL 3 TIMES DAILY PRN
OUTPATIENT
Start: 2024-06-05

## 2024-06-05 RX ORDER — METHYLERGONOVINE MALEATE 0.2 MG/ML
200 INJECTION INTRAVENOUS ONCE AS NEEDED
Status: DISCONTINUED | OUTPATIENT
Start: 2024-06-05 | End: 2024-06-07 | Stop reason: HOSPADM

## 2024-06-05 RX ORDER — ACETAMINOPHEN 325 MG/1
650 TABLET ORAL EVERY 4 HOURS PRN
OUTPATIENT
Start: 2024-06-05

## 2024-06-05 RX ORDER — MORPHINE SULFATE 2 MG/ML
1 INJECTION, SOLUTION INTRAMUSCULAR; INTRAVENOUS EVERY 4 HOURS PRN
Status: DISCONTINUED | OUTPATIENT
Start: 2024-06-05 | End: 2024-06-06 | Stop reason: ALTCHOICE

## 2024-06-05 RX ORDER — HYDROXYZINE HYDROCHLORIDE 25 MG/1
50 TABLET, FILM COATED ORAL NIGHTLY PRN
Status: DISCONTINUED | OUTPATIENT
Start: 2024-06-05 | End: 2024-06-07 | Stop reason: HOSPADM

## 2024-06-05 RX ORDER — SODIUM CHLORIDE, SODIUM LACTATE, POTASSIUM CHLORIDE, CALCIUM CHLORIDE 600; 310; 30; 20 MG/100ML; MG/100ML; MG/100ML; MG/100ML
125 INJECTION, SOLUTION INTRAVENOUS CONTINUOUS
Status: DISCONTINUED | OUTPATIENT
Start: 2024-06-05 | End: 2024-06-07 | Stop reason: HOSPADM

## 2024-06-05 RX ORDER — TRAMADOL HYDROCHLORIDE 50 MG/1
50 TABLET ORAL EVERY 6 HOURS PRN
Status: DISCONTINUED | OUTPATIENT
Start: 2024-06-05 | End: 2024-06-07 | Stop reason: HOSPADM

## 2024-06-05 RX ORDER — METHYLERGONOVINE MALEATE 0.2 MG/ML
200 INJECTION INTRAVENOUS ONCE AS NEEDED
OUTPATIENT
Start: 2024-06-05

## 2024-06-05 RX ORDER — OXYTOCIN/0.9 % SODIUM CHLORIDE 30/500 ML
125 PLASTIC BAG, INJECTION (ML) INTRAVENOUS ONCE AS NEEDED
Status: DISCONTINUED | OUTPATIENT
Start: 2024-06-05 | End: 2024-06-07 | Stop reason: HOSPADM

## 2024-06-05 RX ORDER — PROMETHAZINE HYDROCHLORIDE 25 MG/1
12.5 TABLET ORAL EVERY 6 HOURS PRN
Status: DISCONTINUED | OUTPATIENT
Start: 2024-06-05 | End: 2024-06-05 | Stop reason: HOSPADM

## 2024-06-05 RX ORDER — TERBUTALINE SULFATE 1 MG/ML
0.25 INJECTION, SOLUTION SUBCUTANEOUS AS NEEDED
Status: DISCONTINUED | OUTPATIENT
Start: 2024-06-05 | End: 2024-06-05 | Stop reason: HOSPADM

## 2024-06-05 RX ORDER — OXYTOCIN/0.9 % SODIUM CHLORIDE 30/500 ML
250 PLASTIC BAG, INJECTION (ML) INTRAVENOUS CONTINUOUS
Status: DISPENSED | OUTPATIENT
Start: 2024-06-05 | End: 2024-06-05

## 2024-06-05 RX ORDER — CALCIUM CARBONATE 500 MG/1
2 TABLET, CHEWABLE ORAL 3 TIMES DAILY PRN
Status: DISCONTINUED | OUTPATIENT
Start: 2024-06-05 | End: 2024-06-07 | Stop reason: HOSPADM

## 2024-06-05 RX ORDER — PROMETHAZINE HYDROCHLORIDE 12.5 MG/1
12.5 SUPPOSITORY RECTAL EVERY 6 HOURS PRN
OUTPATIENT
Start: 2024-06-05

## 2024-06-05 RX ORDER — MORPHINE SULFATE 10 MG/ML
10 INJECTION INTRAMUSCULAR; INTRAVENOUS; SUBCUTANEOUS
Status: DISCONTINUED | OUTPATIENT
Start: 2024-06-05 | End: 2024-06-05 | Stop reason: HOSPADM

## 2024-06-05 RX ORDER — ONDANSETRON 2 MG/ML
4 INJECTION INTRAMUSCULAR; INTRAVENOUS EVERY 6 HOURS PRN
Status: DISCONTINUED | OUTPATIENT
Start: 2024-06-05 | End: 2024-06-07 | Stop reason: HOSPADM

## 2024-06-05 RX ORDER — PROMETHAZINE HYDROCHLORIDE 12.5 MG/1
12.5 SUPPOSITORY RECTAL EVERY 6 HOURS PRN
Status: DISCONTINUED | OUTPATIENT
Start: 2024-06-05 | End: 2024-06-07 | Stop reason: HOSPADM

## 2024-06-05 RX ORDER — HYDROCORTISONE 25 MG/G
1 CREAM TOPICAL AS NEEDED
Status: DISCONTINUED | OUTPATIENT
Start: 2024-06-05 | End: 2024-06-07 | Stop reason: HOSPADM

## 2024-06-05 RX ORDER — SODIUM CHLORIDE 0.9 % (FLUSH) 0.9 %
10 SYRINGE (ML) INJECTION EVERY 12 HOURS SCHEDULED
Status: DISCONTINUED | OUTPATIENT
Start: 2024-06-05 | End: 2024-06-05 | Stop reason: HOSPADM

## 2024-06-05 RX ORDER — NALOXONE HCL 0.4 MG/ML
0.4 VIAL (ML) INJECTION
Status: DISCONTINUED | OUTPATIENT
Start: 2024-06-05 | End: 2024-06-06 | Stop reason: ALTCHOICE

## 2024-06-05 RX ORDER — BISACODYL 10 MG
10 SUPPOSITORY, RECTAL RECTAL DAILY PRN
Status: DISCONTINUED | OUTPATIENT
Start: 2024-06-06 | End: 2024-06-07 | Stop reason: HOSPADM

## 2024-06-05 RX ORDER — SODIUM CHLORIDE 0.9 % (FLUSH) 0.9 %
10 SYRINGE (ML) INJECTION AS NEEDED
Status: DISCONTINUED | OUTPATIENT
Start: 2024-06-05 | End: 2024-06-05 | Stop reason: HOSPADM

## 2024-06-05 RX ORDER — DOCUSATE SODIUM 100 MG/1
100 CAPSULE, LIQUID FILLED ORAL 2 TIMES DAILY
Status: DISCONTINUED | OUTPATIENT
Start: 2024-06-05 | End: 2024-06-07 | Stop reason: HOSPADM

## 2024-06-05 RX ORDER — SODIUM CHLORIDE 0.9 % (FLUSH) 0.9 %
1-10 SYRINGE (ML) INJECTION AS NEEDED
Status: DISCONTINUED | OUTPATIENT
Start: 2024-06-05 | End: 2024-06-07 | Stop reason: HOSPADM

## 2024-06-05 RX ORDER — PROMETHAZINE HYDROCHLORIDE 25 MG/1
12.5 TABLET ORAL EVERY 6 HOURS PRN
OUTPATIENT
Start: 2024-06-05

## 2024-06-05 RX ORDER — CARBOPROST TROMETHAMINE 250 UG/ML
250 INJECTION, SOLUTION INTRAMUSCULAR
OUTPATIENT
Start: 2024-06-05

## 2024-06-05 RX ADMIN — PRENATAL VIT W/ FE FUMARATE-FA TAB 27-0.8 MG 1 TABLET: 27-0.8 TAB at 20:27

## 2024-06-05 RX ADMIN — DOCUSATE SODIUM 100 MG: 100 CAPSULE, LIQUID FILLED ORAL at 20:27

## 2024-06-05 RX ADMIN — Medication 999 ML/HR: at 14:29

## 2024-06-05 RX ADMIN — IBUPROFEN 600 MG: 600 TABLET, FILM COATED ORAL at 17:03

## 2024-06-05 RX ADMIN — Medication 250 ML/HR: at 15:14

## 2024-06-05 NOTE — L&D DELIVERY NOTE
[facility]  Vaginal Delivery Note  Review the Delivery Report for details.       Delivery     Delivery: Vaginal, Spontaneous     YOB: 2025    Time of Birth:  Gestational Age 2:22 PM   40w1d     Anesthesia: None     Delivering clinician: Sis Torres    Forceps?   No   Vacuum? No    Shoulder dystocia present: No        Delivery narrative:    The patient was noted to be completely dilated and effaced with the fetal head at the introitus. The fetal vertex was delivered OA spontaneously with maternal pushing efforts. no. The left anterior shoulder was delivered atraumatically by maternal expulsive efforts with the assistance of routine downward traction. The posterior shoulder delivered with maternal expulsive efforts and routine upward traction. The remainder of the fetus delivered spontaneously. Upon delivery, the infant was noted to be vigorous and was passed to the mother's abdomen into the care of the awaiting infant care nurse. Following a 30 second delay in cord clamping, the cord was clamped x2 and cut. Cord blood was obtained for analysis. During the third stage of labor, IV pitocin was administered to enhance uterine contraction. The placenta delivered spontaneously, intact, with a three vessel cord.The cervix, vagina and perineum were inspected for lacerations. No lacerations noted.  Good hemostasis was confirmed.  The uterine fundus was firm at the end of the procedure. Mom and baby tolerated the delivery well. All needle, sponge, and instrument counts were noted to be correct x2 at the end of the procedure.       Infant    Findings: female  infant     Infant observations: Weight: No birth weight on file.   Length:   in  Observations/Comments:        Apgars:   @ 1 minute /      @ 5 minutes   Infant Name:      Placenta, Cord, and Fluid    Placenta delivered  Spontaneous  at   6/5/2024  2:29 PM     Cord:   present.   Nuchal Cord?  no   Cord blood obtained: Yes    Cord gases obtained:  No   "  Cord gas results: Venous:  No results found for: \"PHCVEN\", \"BECVEN\"    Arterial:  No results found for: \"PHCART\", \"BECART\"     Repair    Episiotomy: None     No    Lacerations: No   Estimated Blood Loss: Est. Blood Loss (mL): 100 mL (Filed from Delivery Summary) (06/05/24 1542)             Quantitative Blood Loss:                  Complications  none    Disposition  Mother to Mother Baby/Postpartum  in stable condition currently.  Baby to remains with mom  in stable condition currently.      Sis Torres MD  06/05/24  14:42 CDT       "

## 2024-06-05 NOTE — LACTATION NOTE
Mother's Name: Jessy Phone #:[phone]  Infant Name: undecided :[birth year]24  Gestation:40w1d  Day of life:0  Birth weight:  7-0.5 (3190g) Discharge weight:  Weight Loss:   24 hour Summary of Feeds:  Voids:  Stools:  Assistive devices (shields, shells, etc):NA  Significant Maternal history: , breast fed each previous child for over 2 yrs each. Pelvic prolapse with 2nd child  Maternal Concerns:  denies  Maternal Goal: exclusive breastfeed  Mother's Medications: iron, pnv  Breastpump for home: yes lansinoh  Ped follow up appt:    Mother is experienced breastfeeder.  independently immediately after delivery. Initial breastfeeding packet left at bedside in post partum. Encouragement and support provided.     Instructed patient our lactation team is here for continued support throughout their breastfeeding journey. Our team has encouraged patient to call with any questions or concerns that may arise after discharge.     Breastfeeding and Diaper Chart  Check List for Essentials of Positioning And Latch-on handout provided by Lactation Education Resources  Hand Expression handout provided by Lactation Education Resources  Five Keys to Successful Breastfeeding handout provided by Lactation Education Resources    The Many Benefits if Breastfeeding handout given  Breastfeeding saves time  *Breastfeeding allows you to calm or feed your baby immediately, which leads to a happier baby who cries less  *There is nothing to buy, prepare, or maintain.There is nothing to clean or sterilize.  Breastfeeding builds a mothers confidence  *She knows all her baby needs to thrive is her!  Breastfeeding saves Money  *There is no formula to buy and healthier breast fed babies have less medical costs  Healthy Mom/Healthy baby  * babies get sick less often, and when they do they are usually sick less severely and for a shorter time  * babies have fewer ear infections  * babies have fewer  allergies  *Mothers who breastfeed have a lower risk for cancer, osteoporosis, anemia, high blood pressure, obesity, and Type ll diabetes  *Mothers miss less work days with sick babies  Breast fed babies have a better dental health  * babies have better jaw development which requires lest orthodontic work  *Breast milk does not promote cavities  * babies can nurse at night without worry of tooth decay  Breastfeeding allows a baby to reach his full IQ potential  *The longer a baby is breast fed, the better their brain development  Breast fed babies and moms are more relaxed  *The hormones released during breastfeeding have a calming effect on mothers  *Breastfeeding requires mom to take a break; this may help mom get more rest after delivery  *Breastfeeding is quicker than preparing formula which allows mom and baby to get back to sleep faster  *Breastfeeding promotes bonding and allows mom to learn babies cues and care needs more quickly  Breastfeeding cleanup is easier  *The bowel movements and spit up of breast fed babies doesn't smell as bad  *Spit-up of breast fed babies doesn't stain clothing  Getting out of the hourse is easier  *No formula bottles to prepare and carry safely   *No time restraints due to worry about what baby will eat  *No worries about warming a bottle or finding safe water to prepare bottles  Breastfeeding mother get their bodies back sooner  *The uterus shrinks more quickly and completely, which allows a flatter tummy  *Breastfeeding burns 400-500 calories a day; making milk torches stored fat!  Breastfeeding is better for the environment  *There is no trash to dispose of after breastfeeding  *There is no production facility to produce breast milk; moms body does it all without the pollution of a factory      Your Guide to Breastfeeding Booklet by [employer], [email or web address]      Safe Storage of Breastmilk magnet:  childbirthgraphics.[email or web address]

## 2024-06-05 NOTE — H&P
[facility]  Jessy Melchor  : 1990  MRN: [id number]  CSN: [id number]    History and Physical    Subjective   Jessy Melchor is a 34 y.o. year old  with an Estimated Date of Delivery: 24 currently at 40w1d presenting with regular contractions.    Prenatal care has been with Dr. Sis Torres.  It has been complicated by gestational hypertension.    OB History    Para Term  AB Living   3 2 2 0 0 2   SAB IAB Ectopic Molar Multiple Live Births   0 0 0 0 0 0      # Outcome Date GA Lbr Kadeem/2nd Weight Sex Type Anes PTL Lv   3 Current            2 Term 21    F Vag-Spont      1 Term 19    F Vag-Spont        Past Medical History:   Diagnosis Date    Kidney stone     x 2 hospitalized    Pelvic prolapse     Slight bladder prolapse after 2nd child ‘21    UTI (urinary tract infection)      Past Surgical History:   Procedure Laterality Date    URETEROSCOPY LASER LITHOTRIPSY WITH STENT INSERTION Right 2022    Procedure: RIGHT URETEROSCOPY LASER LITHOTRIPSY WITH STENT INSERTION;  Surgeon: Alitsair Arellano MD;  Location[facility];  Service: Urology;  Laterality: Right;    WISDOM TOOTH EXTRACTION         Current Facility-Administered Medications:     acetaminophen (TYLENOL) tablet 650 mg, 650 mg, Oral, Q4H PRN, Sis Torres MD    lactated ringers bolus 1,000 mL, 1,000 mL, Intravenous, Once PRN, Sis Torres MD    lactated ringers infusion, 125 mL/hr, Intravenous, Continuous, Sis Torres MD    Morphine injection 10 mg, 10 mg, Subcutaneous, Q2H PRN, Sis Torres MD    ondansetron ODT (ZOFRAN-ODT) disintegrating tablet 4 mg, 4 mg, Oral, Q6H PRN **OR** ondansetron (ZOFRAN) injection 4 mg, 4 mg, Intravenous, Q6H PRN, Sis Torres MD    oxytocin (PITOCIN) 30 units in 0.9% sodium chloride 500 mL (premix), 999 mL/hr, Intravenous, Once **FOLLOWED BY** oxytocin (PITOCIN) 30 units in 0.9% sodium chloride 500 mL (premix), 250 mL/hr, Intravenous,  Continuous, Sis Torres MD    promethazine (PHENERGAN) suppository 12.5 mg, 12.5 mg, Rectal, Q6H PRN **OR** promethazine (PHENERGAN) tablet 12.5 mg, 12.5 mg, Oral, Q6H PRN, Sis Torres MD    sodium chloride 0.9 % flush 10 mL, 10 mL, Intravenous, Q12H, Sis Torres MD    sodium chloride 0.9 % flush 10 mL, 10 mL, Intravenous, PRN, Sis Torres MD    sodium chloride 0.9 % infusion 40 mL, 40 mL, Intravenous, PRN, Sis Torres MD    terbutaline (BRETHINE) injection 0.25 mg, 0.25 mg, Subcutaneous, PRN, Sis Torres MD    No Known Allergies  Social History    Tobacco Use      Smoking status: Never      Smokeless tobacco: Never    Review of Systems      Objective   LMP 08/30/2023   General: well developed; well nourished  no acute distress   Heart: regular rate and rhythm, S1, S2 normal, no murmur, click, rub or gallop   Lungs: breathing is unlabored   Abdomen: soft, non-tender; no masses  no umbilical or inguinal hernias are present  no hepato-splenomegaly   FHT's: reactive   Cervix: Checked by RN   Presentation: cephalic   Contractions:  EFW by Leopold's:  EFW by recent u/s: regular           Prenatal Labs  Lab Results   Component Value Date    HGB 8.6 (L) 06/05/2024    HEPBSAG Negative 11/08/2023    ABO O 05/21/2024    RH Positive 05/21/2024    ABSCRN Negative 05/21/2024    [id number] Non Reactive 11/08/2023    URINECX No growth 01/22/2024       Current Labs Reviewed   No data reviewed       Assessment   IUP at 40w1d in active labor  Fetus reassuring  Group B strep status: negative  Gestational HTN with stable BP.       Plan   Expectant management    Sis Torres MD  6/5/2024  12:34 CDT

## 2024-06-05 NOTE — PROGRESS NOTES
Pt with elevated PCR. Recommend delivery as pt is 40 weeks. Instruct pt to go to L&D for labor induction

## 2024-06-06 LAB
HCT VFR BLD AUTO: 24.7 % (ref 34–46.6)
HGB BLD-MCNC: 7.7 G/DL (ref 12–15.9)

## 2024-06-06 PROCEDURE — 85018 HEMOGLOBIN: CPT | Performed by: OBSTETRICS & GYNECOLOGY

## 2024-06-06 PROCEDURE — 85014 HEMATOCRIT: CPT | Performed by: OBSTETRICS & GYNECOLOGY

## 2024-06-06 RX ADMIN — PRENATAL VIT W/ FE FUMARATE-FA TAB 27-0.8 MG 1 TABLET: 27-0.8 TAB at 08:19

## 2024-06-06 RX ADMIN — IBUPROFEN 600 MG: 600 TABLET, FILM COATED ORAL at 16:34

## 2024-06-06 RX ADMIN — IBUPROFEN 600 MG: 600 TABLET, FILM COATED ORAL at 10:33

## 2024-06-06 RX ADMIN — IBUPROFEN 600 MG: 600 TABLET, FILM COATED ORAL at 23:37

## 2024-06-06 RX ADMIN — DOCUSATE SODIUM 100 MG: 100 CAPSULE, LIQUID FILLED ORAL at 20:10

## 2024-06-06 RX ADMIN — DOCUSATE SODIUM 100 MG: 100 CAPSULE, LIQUID FILLED ORAL at 08:19

## 2024-06-06 RX ADMIN — IBUPROFEN 600 MG: 600 TABLET, FILM COATED ORAL at 02:45

## 2024-06-06 NOTE — PROGRESS NOTES
"      ANGELA Rushing  [facility]  [address]  [place] [address]  Office [phone]  Fax [phone]    [facility]  Vaginal Delivery Progress Note    Subjective   Postpartum Day 1: Vaginal Delivery    The patient feels well.  Her pain is well controlled with  Motrin .   She is ambulating well.  Patient describes her bleeding as  light .    Breastfeeding: infant latching without difficulty without pain.    Objective     Vital Signs Range for the last 24 hours  Temperature: Temp:  [97.4 °F (36.3 °C)-98.2 °F (36.8 °C)] 98.2 °F (36.8 °C)   Temp Source: Temp src: Oral   BP: BP: (123-149)/(68-91) 124/80   Pulse: Heart Rate:  [] 75   Respirations: Resp:  [16-18] 16   SPO2: SpO2:  [81 %-100 %] 98 %   O2 Amount (l/min):     O2 Devices Device (Oxygen Therapy): room air   Weight:       Admit Height:         Physical Exam:  General:  no acute distresss.  Abdomen: Soft, non-tender  Extremities: normal, atraumatic, no cyanosis, and trace edema.   Calves non-tender    Lab results reviewed:  Yes   Rubella:  No results found for: \"RUBELLAIGGIN\" Nurse Transcribed from prenatal record --  No components found for: \"EXTRUBELQUAL\"  Rh Status:    RH type   Date Value Ref Range Status   06/05/2024 Positive  Final     Immunizations:   Immunization History   Administered Date(s) Administered    COVID-19 (MODERNA) 1st,2nd,3rd Dose Monovalent 01/12/2022    Tdap 10/01/2020, 03/27/2024     Lab Results (last 24 hours)       Procedure Component Value Units Date/Time    Hemoglobin & Hematocrit, Blood [[id number]]  (Abnormal) Collected: 06/06/24 0550    Specimen: Blood Updated: 06/06/24 0621     Hemoglobin 7.7 g/dL      Hematocrit 24.7 %     T Pallidum Antibody w/ reflex RPR (Syphilis) [[id number]]  (Normal) Collected: 06/05/24 1152    Specimen: Blood Updated: 06/05/24 1923     Treponemal AB Total Non-Reactive            External Prenatal Results       Pregnancy Outside Results - Transcribed From Office Records - See " Scanned Records For Details       Test Value Date Time    ABO  O  06/05/24 1152    Rh  Positive  06/05/24 1152    Antibody Screen  Negative  06/05/24 1152       Negative  05/21/24 1030       Negative  11/08/23 1107    Varicella IgG  2,433 index 11/08/23 1107    Rubella  2.96 index 11/08/23 1107    Hgb  7.7 g/dL 06/06/24 0550       8.6 g/dL 06/05/24 1152       8.4 g/dL 06/04/24 0959       8.3 g/dL 05/21/24 1030       10.0 g/dL 03/14/24 0859       11.4 g/dL 01/03/24 1225       12.2 g/dL 11/08/23 1107    Hct  24.7 % 06/06/24 0550       26.9 % 06/05/24 1152       26.3 % 06/04/24 0959       26.4 % 05/21/24 1030       35.2 % 01/03/24 1225       37.4 % 11/08/23 1107    HgB A1c        1h GTT  91 mg/dL 03/14/24 0859    3h GTT Fasting       3h GTT 1 hour       3h GTT 2 hour       3h GTT 3 hour        Gonorrhea (discrete)  Negative  05/07/24 1024       Negative  11/08/23 1107    Chlamydia (discrete)  Negative  05/07/24 1024       Negative  11/08/23 1107    RPR  Non Reactive  03/14/24 0859       Non Reactive  11/08/23 1107    Syphilis Antibody       HBsAg  Negative  11/08/23 1107    Herpes Simplex Virus PCR       Herpes Simplex VIrus Culture       HIV  Non Reactive  11/08/23 1107    Hep C RNA Quant PCR       Hep C Antibody       AFP       NIPT       Cystic Fibroisis        Group B Strep  Negative  05/07/24 0820    GBS Susceptibility to Clindamycin       GBS Susceptibility to Erythromycin       Fetal Fibronectin       Genetic Testing, Maternal Blood                 Drug Screening       Test Value Date Time    Urine Drug Screen       Amphetamine Screen       Barbiturate Screen       Benzodiazepine Screen       Methadone Screen       Phencyclidine Screen       Opiates Screen       THC Screen       Cocaine Screen       Propoxyphene Screen       Buprenorphine Screen       Methamphetamine Screen       Oxycodone Screen       Tricyclic Antidepressants Screen                 Legend    ^: Historical                            Assessment  & Plan       * No active hospital problems. *      Jessy Melchor is Day 1  post-partum  Vaginal, Spontaneous   .      Plan:  Continue current postpartum plan of care. Lactation support encouraged. Blood pressure monitoring and symptoms to report discussed with patient. Discharge tomorrow discussed with patient if vitals remain stable.       This note has been signed electronically.    Sailaja Ga DNP, APRN, CNM, RNC-OB  6/6/2024  13:12 CDT

## 2024-06-06 NOTE — PAYOR COMM NOTE
"REF:[id number]    [facility]  [place],  [phone]  OR  FAX  [phone]    Jessy Melvin \"NEFTALI\" (34 y.o. Female)       Date of Birth   1990    Social Security Number       Address   [address] [place] [id number]    Home Phone   [phone]    MRN   [id number]       Taoist   Moravian    Marital Status                               Admission Date   24    Admission Type   Elective    Admitting Provider   Sis Torres MD    Attending Provider   Sis Torres MD    Department, Room/Bed   [facility] MOTHER BABY 2A, M265/1       Discharge Date       Discharge Disposition       Discharge Destination                                 Attending Provider: Sis Torres MD    Allergies: No Known Allergies    Isolation: None   Infection: None   Code Status: CPR    Ht: 157.5 cm (62.01\")   Wt: 78.7 kg (173 lb 8 oz)    Admission Cmt: None   Principal Problem: None                  Active Insurance as of 2024       Primary Coverage       Payor Plan Insurance Group Employer/Plan Group    [id number] [id number]       Payor Plan Address Payor Plan Phone Number Payor Plan Fax Number Effective Dates    [address] [phone]  2024 - None Entered    [facility] [address]         Subscriber Name Subscriber Birth Date Member ID       MARIE MELVIN Q 1989 [id number]                     Emergency Contacts        (Rel.) Home Phone Work Phone Mobile Phone    MARIE MELVIN (Spouse) [phone] -- --     EDC 2024   G-3 P-2   40/1  GESTATIONAL AGE     Dianne Melvin [[id number]]    Labor Events     labor?: No   steroids?: None  Antibiotics during labor?: No  Rupture date/time: 2024 1420  Rupture type: spontaneous rupture of membranes  Fluid color: normal, clear  Fluid odor: None  Labor type: Spontaneous Onset of Labor  Labor allowed to proceed with plans for an attempted " "vaginal birth?: Yes  Complications: None  Presentation    Presentation: Vertex  Position: Right Occiput Anterior  [place] Delivery    Head delivery date/time: 2024 14:22:01  Delivery date/time: 2024  2:22 PM   Delivery type: Vaginal, Spontaneous   Details: Trial of labor?: Yes        Operative Delivery    Forceps attempted?: No  Vacuum extractor attempted?: No                  [place] Assessment    Living status: Living  Infant family band number: [id number]  Apgars   1 Minute: 5 Minute: 10 Minute 15 Minute 20 Minute   Skin Color: 0 1      Heart Rate: 2 2      Reflex Irritability: 2 2      Muscle Tone: 2 2      Respiratory Effort: 2 2      Total: 8 9              Apgars Assigned By: MITCHELL HURD  Resuscitation    Method: Suctioning, Tactile Stimulation  Suction Details  Suction method: bulb syringe  Airway suction: mouth, nose  Oxygen Details  Skin to Skin    Skin to skin initiated date/time: 2024 1423  Skin to skin with: Mother  Measurements    Weight: 7 lb 0.5 oz 3190 g Length: 20\"   Birth comments: HC:35cm  Delivery Information    Episiotomy: None  Perineal lacerations: None    Vaginal delivery est. blood loss (mL): 100  Surgical or additional est. blood loss (mL): 0  Combined est. blood loss (mL): 100       History & Physical        Sis Torres MD at 24 1234          [facility]  Jessy Melchor  : 1990  MRN: [id number]  CSN: [id number]    History and Physical    Subjective  Jessy Melchor is a 34 y.o. year old  with an Estimated Date of Delivery: 24 currently at 40w1d presenting with regular contractions.    Prenatal care has been with Dr. Sis Torres.  It has been complicated by gestational hypertension.    OB History    Para Term  AB Living   3 2 2 0 0 2   SAB IAB Ectopic Molar Multiple Live Births   0 0 0 0 0 0      # Outcome Date GA Lbr Kadeem/2nd Weight Sex Type Anes PTL Lv   3 Current            2 Term 21    F Vag-Spont      1 " Term 03/26/19    F Vag-Spont        Past Medical History:   Diagnosis Date    Kidney stone     x 2 hospitalized    Pelvic prolapse     Slight bladder prolapse after 2nd child ‘21    UTI (urinary tract infection)      Past Surgical History:   Procedure Laterality Date    URETEROSCOPY LASER LITHOTRIPSY WITH STENT INSERTION Right 4/22/2022    Procedure: RIGHT URETEROSCOPY LASER LITHOTRIPSY WITH STENT INSERTION;  Surgeon: Alistair Arellano MD;  Location: [facility];  Service: Urology;  Laterality: Right;    WISDOM TOOTH EXTRACTION         Current Facility-Administered Medications:     acetaminophen (TYLENOL) tablet 650 mg, 650 mg, Oral, Q4H PRN, Sis Torres MD    lactated ringers bolus 1,000 mL, 1,000 mL, Intravenous, Once PRN, Sis Torres MD    lactated ringers infusion, 125 mL/hr, Intravenous, Continuous, Sis Torres MD    Morphine injection 10 mg, 10 mg, Subcutaneous, Q2H PRN, Sis Torres MD    ondansetron ODT (ZOFRAN-ODT) disintegrating tablet 4 mg, 4 mg, Oral, Q6H PRN **OR** ondansetron (ZOFRAN) injection 4 mg, 4 mg, Intravenous, Q6H PRN, Sis Torres MD    oxytocin (PITOCIN) 30 units in 0.9% sodium chloride 500 mL (premix), 999 mL/hr, Intravenous, Once **FOLLOWED BY** oxytocin (PITOCIN) 30 units in 0.9% sodium chloride 500 mL (premix), 250 mL/hr, Intravenous, Continuous, Sis Torres MD    promethazine (PHENERGAN) suppository 12.5 mg, 12.5 mg, Rectal, Q6H PRN **OR** promethazine (PHENERGAN) tablet 12.5 mg, 12.5 mg, Oral, Q6H PRN, Sis Torres MD    sodium chloride 0.9 % flush 10 mL, 10 mL, Intravenous, Q12H, Sis Torres MD    sodium chloride 0.9 % flush 10 mL, 10 mL, Intravenous, PRN, Sis Torres MD    sodium chloride 0.9 % infusion 40 mL, 40 mL, Intravenous, PRN, Sis Torres MD    terbutaline (BRETHINE) injection 0.25 mg, 0.25 mg, Subcutaneous, PRN, Sis Torres MD    No Known Allergies  Social History    Tobacco Use       Smoking status: Never      Smokeless tobacco: Never    Review of Systems      Objective  LMP 08/30/2023   General: well developed; well nourished  no acute distress   Heart: regular rate and rhythm, S1, S2 normal, no murmur, click, rub or gallop   Lungs: breathing is unlabored   Abdomen: soft, non-tender; no masses  no umbilical or inguinal hernias are present  no hepato-splenomegaly   FHT's: reactive   Cervix: Checked by RN   Presentation: cephalic   Contractions:  EFW by Leopold's:  EFW by recent u/s: regular           Prenatal Labs  Lab Results   Component Value Date    HGB 8.6 (L) 06/05/2024    HEPBSAG Negative 11/08/2023    ABO O 05/21/2024    RH Positive 05/21/2024    ABSCRN Negative 05/21/2024    [id number] Non Reactive 11/08/2023    URINECX No growth 01/22/2024       Current Labs Reviewed   No data reviewed       Assessment  IUP at 40w1d in active labor  Fetus reassuring  Group B strep status: negative  Gestational HTN with stable BP.       Plan  Expectant management    Sis Torres MD  6/5/2024  12:34 CDT        Electronically signed by Sis Torres MD at 06/05/24 1236          Operative/Procedure Notes (last 48 hours)        Sis Torres MD at 06/05/24 1442          [facility]  Vaginal Delivery Note  Review the Delivery Report for details.       Delivery     Delivery: Vaginal, Spontaneous     YOB: 2025    Time of Birth:  Gestational Age 2:22 PM   40w1d     Anesthesia: None     Delivering clinician: Sis Torres    Forceps?   No   Vacuum? No    Shoulder dystocia present: No        Delivery narrative:    The patient was noted to be completely dilated and effaced with the fetal head at the introitus. The fetal vertex was delivered OA spontaneously with maternal pushing efforts. no. The left anterior shoulder was delivered atraumatically by maternal expulsive efforts with the assistance of routine downward traction. The posterior shoulder delivered with maternal  "expulsive efforts and routine upward traction. The remainder of the fetus delivered spontaneously. Upon delivery, the infant was noted to be vigorous and was passed to the mother's abdomen into the care of the awaiting infant care nurse. Following a 30 second delay in cord clamping, the cord was clamped x2 and cut. Cord blood was obtained for analysis. During the third stage of labor, IV pitocin was administered to enhance uterine contraction. The placenta delivered spontaneously, intact, with a three vessel cord.The cervix, vagina and perineum were inspected for lacerations. No lacerations noted.  Good hemostasis was confirmed.  The uterine fundus was firm at the end of the procedure. Mom and baby tolerated the delivery well. All needle, sponge, and instrument counts were noted to be correct x2 at the end of the procedure.       Infant    Findings: female  infant     Infant observations: Weight: No birth weight on file.   Length:   in  Observations/Comments:        Apgars:   @ 1 minute /      @ 5 minutes   Infant Name:      Placenta, Cord, and Fluid    Placenta delivered  Spontaneous  at   6/5/2024  2:29 PM     Cord:   present.   Nuchal Cord?  no   Cord blood obtained: Yes    Cord gases obtained:  No    Cord gas results: Venous:  No results found for: \"PHCVEN\", \"BECVEN\"    Arterial:  No results found for: \"PHCART\", \"BECART\"     Repair    Episiotomy: None     No    Lacerations: No   Estimated Blood Loss: Est. Blood Loss (mL): 100 mL (Filed from Delivery Summary) (06/05/24 1422)             Quantitative Blood Loss:                  Complications  none    Disposition  Mother to Mother Baby/Postpartum  in stable condition currently.  Baby to remains with mom  in stable condition currently.      Sis Torres MD  06/05/24  14:42 CDT         Electronically signed by Sis Torres MD at 06/05/24 2134       Physician Progress Notes (last 48 hours)  Notes from 06/04/24 0924 through 06/06/24 0924   No notes of " this type exist for this encounter.

## 2024-06-06 NOTE — LACTATION NOTE
Mother's Name: Jessy Phone #:[phone]  Infant Name: undecided :2024  Gestation:40w1d  Day of life:1  Birth weight:  7-0.5 (3190g) Discharge weight:  Weight Loss: -2.51%  24 hour Summary of Feeds: 10 BF (short feedings) Voids: 4 Stools:5  Emesis:1  Assistive devices (shields, shells, etc):NA  Significant Maternal history: , breast fed each previous child for over 2 yrs each. Pelvic prolapse with 2nd child  Maternal Concerns:  denies  Maternal Goal: exclusive breastfeed  Mother's Medications: iron, pnv  Breastpump for home: yes lansinoh  Ped follow up appt:    F/u with mother to discuss breastfeeding progress. Mother voices infant seems to be cluster feeding, short feedings but will wake within 5 mins of laying back down, very fussy through the night, a couple of emesis episodes of amniotic fluid. Reviewed feedings. Encouraged mother to work on more aggressive feedings today ( skin to skin, compressing breasts, keeping infant awake during feedings). Encouraged to offer both breasts with feedings and goal of a minimal of 10/15 mins on each breast. Encouraged to call lactation throughout the day if questions or assistance needed. Plans of delaying bath.       Instructed patient our lactation team is here for continued support throughout their breastfeeding journey. Our team has encouraged patient to call with any questions or concerns that may arise after discharge.

## 2024-06-06 NOTE — PLAN OF CARE
Goal Outcome Evaluation:  Plan of Care Reviewed With: patient, spouse        Progress: improving  Outcome Evaluation: VSS, Voiding, ambulating breastfeeding, prn pain meds used with relief, bonding well with infant

## 2024-06-07 VITALS
RESPIRATION RATE: 14 BRPM | OXYGEN SATURATION: 98 % | DIASTOLIC BLOOD PRESSURE: 82 MMHG | HEART RATE: 71 BPM | SYSTOLIC BLOOD PRESSURE: 128 MMHG | TEMPERATURE: 97.5 F

## 2024-06-07 PROBLEM — O09.529 AMA (ADVANCED MATERNAL AGE) MULTIGRAVIDA 35+, UNSPECIFIED TRIMESTER: Status: RESOLVED | Noted: 2024-01-11 | Resolved: 2024-06-07

## 2024-06-07 PROBLEM — Z34.90 PREGNANCY: Status: RESOLVED | Noted: 2023-11-08 | Resolved: 2024-06-07

## 2024-06-07 PROBLEM — O13.3 PREGNANCY-INDUCED HYPERTENSION, THIRD TRIMESTER: Status: ACTIVE | Noted: 2024-06-07

## 2024-06-07 PROBLEM — O99.210 OBESITY AFFECTING PREGNANCY, ANTEPARTUM: Status: RESOLVED | Noted: 2024-01-16 | Resolved: 2024-06-07

## 2024-06-07 RX ORDER — IBUPROFEN 200 MG
400 TABLET ORAL EVERY 4 HOURS PRN
Start: 2024-06-07

## 2024-06-07 RX ADMIN — PRENATAL VIT W/ FE FUMARATE-FA TAB 27-0.8 MG 1 TABLET: 27-0.8 TAB at 09:18

## 2024-06-07 RX ADMIN — DOCUSATE SODIUM 100 MG: 100 CAPSULE, LIQUID FILLED ORAL at 09:18

## 2024-06-07 NOTE — DISCHARGE SUMMARY
[facility]    Sailaja Albertn, APRN  [address]  [place] [address]  [phone]      Discharge Summary    [facility]  Jessy Melchor  : 1990  MRN: [id number]  CSN: [id number]    Date of Admission: 2024   Date of Discharge:  2024   Delivering Physician: Sis Torres        Admission Diagnosis: Pregnancy [Z34.90]   Discharge Diagnosis: Pregnancy at 40w1d - delivered       Procedures: 2024  - Vaginal, Spontaneous       Hospital Course  Patient is a 34 y.o.  who at 40w1d had a vaginal birth.  Her postpartum course was without complications.  On PPD #2 she was ready for discharge.  She had normal lochia and pain well controlled with oral medications.    Infant  female  fetus weighing 3190 g (7 lb 0.5 oz)   Apgars -  8 @ 1 minute /  9 @ 5 minutes.    Discharge labs  Lab Results   Component Value Date    WBC 14.89 (H) 2024    HGB 7.7 (L) 2024    HCT 24.7 (L) 2024     2024       Discharge Medications     Discharge Medications        New Medications        Instructions Start Date   ibuprofen 200 MG tablet  Commonly known as: ADVIL,MOTRIN   400 mg, Oral, Every 4 Hours PRN             Continue These Medications        Instructions Start Date   acetaminophen 500 MG tablet  Commonly known as: TYLENOL   500 mg, Oral, Every 6 Hours PRN      calcium carbonate 500 MG chewable tablet  Commonly known as: TUMS   1 tablet, Oral, Daily      ferrous sulfate 325 (65 FE) MG tablet   325 mg, Oral, Daily With Breakfast      PRENATAL 1 + IRON PO   Oral               External Prenatal Results       Pregnancy Outside Results - Transcribed From Office Records - See Scanned Records For Details       Test Value Date Time    ABO  O  24 1152    Rh  Positive  24 1152    Antibody Screen  Negative  24 1152       Negative  24 1030       Negative  23 1107    Varicella IgG  2,433 index 23 1107    Rubella  2.96 index  11/08/23 1107    Hgb  7.7 g/dL 06/06/24 0550       8.6 g/dL 06/05/24 1152       8.4 g/dL 06/04/24 0959       8.3 g/dL 05/21/24 1030       10.0 g/dL 03/14/24 0859       11.4 g/dL 01/03/24 1225       12.2 g/dL 11/08/23 1107    Hct  24.7 % 06/06/24 0550       26.9 % 06/05/24 1152       26.3 % 06/04/24 0959       26.4 % 05/21/24 1030       35.2 % 01/03/24 1225       37.4 % 11/08/23 1107    HgB A1c        1h GTT  91 mg/dL 03/14/24 0859    3h GTT Fasting       3h GTT 1 hour       3h GTT 2 hour       3h GTT 3 hour        Gonorrhea (discrete)  Negative  05/07/24 1024       Negative  11/08/23 1107    Chlamydia (discrete)  Negative  05/07/24 1024       Negative  11/08/23 1107    RPR  Non Reactive  03/14/24 0859       Non Reactive  11/08/23 1107    Syphilis Antibody       HBsAg  Negative  11/08/23 1107    Herpes Simplex Virus PCR       Herpes Simplex VIrus Culture       HIV  Non Reactive  11/08/23 1107    Hep C RNA Quant PCR       Hep C Antibody       AFP       NIPT       Cystic Fibroisis        Group B Strep  Negative  05/07/24 0820    GBS Susceptibility to Clindamycin       GBS Susceptibility to Erythromycin       Fetal Fibronectin       Genetic Testing, Maternal Blood                 Drug Screening       Test Value Date Time    Urine Drug Screen       Amphetamine Screen       Barbiturate Screen       Benzodiazepine Screen       Methadone Screen       Phencyclidine Screen       Opiates Screen       THC Screen       Cocaine Screen       Propoxyphene Screen       Buprenorphine Screen       Methamphetamine Screen       Oxycodone Screen       Tricyclic Antidepressants Screen                 Legend    ^: Historical                            Discharge Disposition Home or Self Care   Condition on Discharge: good   Follow-up: 2 weeks with JARAD Ga for blood pressure check and in 6 weeks with Dr. Torres or JARAD for 6-week postpartum visit.         Plan for discharge reviewed with Dr. Phillip.    This note has been signed  electronically.    Sailaja Ga, SANCHEZ, APRN, CNM, RNC-OB  6/7/2024

## 2024-06-07 NOTE — LACTATION NOTE
Mother's Name: Jessy Phone #: [phone]  Infant Name: Danielle  : 2025  Gestation: 40w1d  Day of life: 2  Birth weight: 7-0.5 (3190g) Discharge weight: 6-9.6 (2995g)  Weight Loss: -6.11%  24 hour Summary of Feeds: 11 BF Voids: 3 Stools: 4 Emesis: 0  Assistive devices (shields, shells, etc): NA  Significant Maternal history: , Pelvic Prolapse Maternal Concerns: None  Maternal Goal: Breastfeed exclusively   Mother's Medications: PNV, Iron  Breastpump for home: Lansinoh  Ped follow up appt: Chinedu-24 at 1300. ANGELA Dodd-6/10/24 at 1015.     Patient is an experienced breastfeeder. Reports breastfeeding to be going well. Questions, concerns, and issues denied. Much encouragement provided for breastfeeding success. Discharge breastfeeding education provided (see below). Discussed outpatient  lactation support.     Instructed patient our lactation team is here for continued support throughout their breastfeeding journey. Our team has encouraged patient to call with any questions or concerns that may arise after discharge.     Breastfeeding After Discharge   Signs of Milk: Fullness, firmness, heaviness of breasts, leaking of milk.  Signs of Good Feed: Breast fullness prior to feed, breasts soft and comfortable after feeding. Infant content after feeding: calm, sleepy, relaxed and without continued hunger cues.  Signs of Plugged Ducts, Engorgement and Mastitis: Plugged ducts (milk entrapment in milk ducts)- small tender knots that often feel like little beans under breast tissue, usually tender. Massage on these areas of concern while breastfeeding or pumping to promote emptying.   Engorgement- fluid or excess milk, breasts become uncomfortably full, tight, firm (compare to the firmness of your cheek (mild), chin (moderate) or forehead (severe). First line of treatment should be to BREASTFEED, if breasts remain full feeling after a feeding, it may be necessary to pump, ONLY UNTIL BREASTS ARE SOFT  AND COMFORTABLE. DO NOT OVER PUMP (complete emptying of breasts can trigger even more milk which will cause continued, recurrent Engorgement).  Mastitis- Infection of the breast tissue, most often caused by plugged ducts that are not adequately treated by emptying, recurrent trauma to nipples or breasts (cracked or bleeding nipples). Signs: redness, swelling, tender knots or fever to breasts as well as generalized fever >101 degrees F that is often sudden onset. Treatment of mastitis, BREASTFEED! Pump after breastfeeding to achieve COMPLETE emptying of affected breast, utilizing massage to areas of concern, may use cold compress to affected area only after breast emptying. May take anti-inflammatories i.e. Ibuprofen, Motrin. CALL your OB for assessment and continued treatment with Antibiotics to adequately treat mastitis.  Infant Care: Over the first 2 weeks it is important to keep record of infant's feeding routine (feeding times and durations), wet and dirty diaper frequency, stool color and any spit ups that may occur.  Keep in mind, ALL babies will lose some weight initially (usually no more than 10% by day 3). Until infant returns to/ surpasses birth weight (which can take up to 2 weeks), it is important to offer feedings AT LEAST EVERY 3 HOURS. Remember, if you choose to supplement infant with formula or previously pumped milk, you should always pump in replacement of that feeding in order to promote and maintain a healthy milk supply!  Maternal Care: REST, sleep when the infant sleeps, stay hydrated (water is optimal) drink to thirst, increase caloric intake - breastfeeding mother's need an ADDITIONAL 500 calories per day , eat 3 meals/day as well as snacks in between, limit CAFFIENE intake to 2 cups/day. Ask your significant other, family members or friends for help when needed, taking advantage of meal trains, allowing others to help with laundry, house chores, etc can help you focus on what is most  important early on after delivery… you and your infant, and breastfeeding!   Medications to CONTINUE: Prenatal Vitamins are important to continue taking while breastfeeding. Fish oil, magnesium/calcium supplements often are helpful to support Mothers and their milk supply as well. Tylenol, Ibuprofen, regular Zyrtec, Claritin are SAFE if you suffer from seasonal allergies. Flonase is safe and often an effective medication to take if suffering from sinus drainage/pressure.  Medications to AVOID: Benadryl, Sudafed, any medications including “DM” or have a drying effect to sinus drainage will also dry a mother's milk up. Birth control- your OB will want to address birth control options with you usually around 4-6 weeks postpartum, be sure to notify your MD if you continue to breastfeed as some birth controls may significantly decrease your milk supply. Herbals- some herbs may also decrease your milk supply: PEPPERMINT, MENTHOL in any form (candies, essential oils, teas, etc), so check labels and avoid using in excess.  Pumping: Although we encourage you to focus on breastfeeding over the first 2-4 weeks, you will need to plan to begin pumping. We do recommend implementing pumping by the time infant is 4 weeks old. Pump 2-3 times per day immediately AFTER breastfeeding, it is normal to collect very small amounts initially, but the more consistently you pump, the more you will begin to collect. Store collected milk in refrigerator or freezer. You should also begin offering infant a bottle around 4 weeks. Remember to use slow flow nipples and PACE the bottle-feed. A bottle feed should take about as long as a breastfeeding session.

## 2024-06-07 NOTE — PAYOR COMM NOTE
"REF:   [id number]     [facility]  FAX  [phone]       Ruiz Jessy JOSELUIS \"NEFTALI\" (34 y.o. Female)       Date of Birth   1990    Social Security Number       Address   [address] [place] [id number]    Home Phone   [phone]    MRN   [id number]       Shinto   Islam    Marital Status                               Admission Date   24    Admission Type   Elective    Admitting Provider   Sis Torres MD    Attending Provider       Department, Room/Bed   [facility] MOTHER BABY 2A, M265/1       Discharge Date   2024    Discharge Disposition   Home or Self Care    Discharge Destination                                 Attending Provider: (none)   Allergies: No Known Allergies    Isolation: None   Infection: None   Code Status: Prior    Ht: 157.5 cm (62.01\")   Wt: 78.7 kg (173 lb 8 oz)    Admission Cmt: None   Principal Problem: None                  Active Insurance as of 2024       Primary Coverage       Payor Plan Insurance Group Employer/Plan Group    ANTH [employer] ANTH [employer] HMO [id number]       Payor Plan Address Payor Plan Phone Number Payor Plan Fax Number Effective Dates    [address] [phone]  2024 - None Entered    [place] [address]         Subscriber Name Subscriber Birth Date Member ID       MARIE MELVIN Q 1989 [id number]                     Emergency Contacts        (Rel.) Home Phone Work Phone Mobile Phone    MARIE MELVIN (Spouse) [phone] -- --                 Discharge Summary        Sailaja Ga APRN at 24 0750       Attestation signed by David Phillip MD at 24 1000    I have reviewed this documentation and agree.                  [facility]    ANGELA Rushing  [address]  [place] [address]  [phone]      Discharge Summary    [facility]  Jessy Melvin  : 1990  MRN: [id number]  CSN: [id number]    Date of " Admission: 2024   Date of Discharge:  2024   Delivering Physician: Sis Torres        Admission Diagnosis: Pregnancy [Z34.90]   Discharge Diagnosis: Pregnancy at 40w1d - delivered       Procedures: 2024  - Vaginal, Spontaneous       Hospital Course  Patient is a 34 y.o.  who at 40w1d had a vaginal birth.  Her postpartum course was without complications.  On PPD #2 she was ready for discharge.  She had normal lochia and pain well controlled with oral medications.    Infant  female  fetus weighing 3190 g (7 lb 0.5 oz)   Apgars -  8 @ 1 minute /  9 @ 5 minutes.    Discharge labs  Lab Results   Component Value Date    WBC 14.89 (H) 2024    HGB 7.7 (L) 2024    HCT 24.7 (L) 2024     2024       Discharge Medications     Discharge Medications        New Medications        Instructions Start Date   ibuprofen 200 MG tablet  Commonly known as: ADVIL,MOTRIN   400 mg, Oral, Every 4 Hours PRN             Continue These Medications        Instructions Start Date   acetaminophen 500 MG tablet  Commonly known as: TYLENOL   500 mg, Oral, Every 6 Hours PRN      calcium carbonate 500 MG chewable tablet  Commonly known as: TUMS   1 tablet, Oral, Daily      ferrous sulfate 325 (65 FE) MG tablet   325 mg, Oral, Daily With Breakfast      PRENATAL 1 + IRON PO   Oral               External Prenatal Results       Pregnancy Outside Results - Transcribed From Office Records - See Scanned Records For Details       Test Value Date Time    ABO  O  24 1152    Rh  Positive  24 1152    Antibody Screen  Negative  24 1152       Negative  24 1030       Negative  23 1107    Varicella IgG  2,433 index 23 1107    Rubella  2.96 index 23 1107    Hgb  7.7 g/dL 24 0550       8.6 g/dL 24 1152       8.4 g/dL 24 0959       8.3 g/dL 24 1030       10.0 g/dL 24 0859       11.4 g/dL 24 1225       12.2 g/dL 23 1107    Hct  24.7 %  06/06/24 0550       26.9 % 06/05/24 1152       26.3 % 06/04/24 0959       26.4 % 05/21/24 1030       35.2 % 01/03/24 1225       37.4 % 11/08/23 1107    HgB A1c        1h GTT  91 mg/dL 03/14/24 0859    3h GTT Fasting       3h GTT 1 hour       3h GTT 2 hour       3h GTT 3 hour        Gonorrhea (discrete)  Negative  05/07/24 1024       Negative  11/08/23 1107    Chlamydia (discrete)  Negative  05/07/24 1024       Negative  11/08/23 1107    RPR  Non Reactive  03/14/24 0859       Non Reactive  11/08/23 1107    Syphilis Antibody       HBsAg  Negative  11/08/23 1107    Herpes Simplex Virus PCR       Herpes Simplex VIrus Culture       HIV  Non Reactive  11/08/23 1107    Hep C RNA Quant PCR       Hep C Antibody       AFP       NIPT       Cystic Fibroisis        Group B Strep  Negative  05/07/24 0820    GBS Susceptibility to Clindamycin       GBS Susceptibility to Erythromycin       Fetal Fibronectin       Genetic Testing, Maternal Blood                 Drug Screening       Test Value Date Time    Urine Drug Screen       Amphetamine Screen       Barbiturate Screen       Benzodiazepine Screen       Methadone Screen       Phencyclidine Screen       Opiates Screen       THC Screen       Cocaine Screen       Propoxyphene Screen       Buprenorphine Screen       Methamphetamine Screen       Oxycodone Screen       Tricyclic Antidepressants Screen                 Legend    ^: Historical                            Discharge Disposition Home or Self Care   Condition on Discharge: good   Follow-up: 2 weeks with JARAD Ga for blood pressure check and in 6 weeks with Dr. Torres or JARAD for 6-week postpartum visit.         Plan for discharge reviewed with Dr. Phillip.    This note has been signed electronically.    Sailaja Ga, SANCHEZ, APRN, JARAD, RNC-OB  6/7/2024        Electronically signed by David Phillip MD at 06/07/24 1000       Discharge Order (From admission, onward)       Start     Ordered    06/07/24 0747  Discharge patient  Once         Expected Discharge Date: 06/07/24   Discharge Disposition: Home or Self Care   Physician of Record for Attribution - Please select from Treatment Team: CARLITOS WRAY [[id number]]   Review needed by CMO to determine Physician of Record: No      Question Answer Comment   Physician of Record for Attribution - Please select from Treatment Team CARLITOS WRAY    Review needed by CMO to determine Physician of Record No        06/07/24 0749

## 2024-06-07 NOTE — DISCHARGE INSTR - APPOINTMENTS
Appointment with Sailaja Ga for BP check on June 21st at 11:15 am    Appointment with  on July 10th at 1:15 pm

## 2024-06-07 NOTE — PROGRESS NOTES
"      ANGELA Rushing  [facility]  [address]  [place] [address]  Office [phone]  Fax [phone]    [facility]  Vaginal Delivery Progress Note    Subjective   Postpartum Day 2: Vaginal Delivery    The patient feels well.  Her pain is managed with Motrin and comfort products.   She is ambulating well.  Patient describes her bleeding as thin lochia.    Breastfeeding: Infant latching without difficulty.    Objective     Vital Signs Range for the last 24 hours  Temperature: Temp:  [98.2 °F (36.8 °C)] 98.2 °F (36.8 °C)   Temp Source: Temp src: Temporal   BP: BP: (124-129)/(73-80) 129/73   Pulse: Heart Rate:  [75-82] 82   Respirations: Resp:  [16] 16   SPO2: SpO2:  [98 %-99 %] 99 %   O2 Amount (l/min):     O2 Devices Device (Oxygen Therapy): room air   Weight:       Admit Height:         Physical Exam:  General:  no acute distresss.  Abdomen: Soft and without significant tenderness.  Fundus firm  Extremities: normal, atraumatic, no cyanosis, and trace edema.       Lab results reviewed: Yes  Rubella:  No results found for: \"RUBELLAIGGIN\" Nurse Transcribed from prenatal record --  No components found for: \"EXTRUBELQUAL\"  Rh Status:    RH type   Date Value Ref Range Status   06/05/2024 Positive  Final     Immunizations:   Immunization History   Administered Date(s) Administered    COVID-19 (MODERNA) 1st,2nd,3rd Dose Monovalent 01/12/2022    Tdap 10/01/2020, 03/27/2024     Lab Results (last 24 hours)       ** No results found for the last 24 hours. **            External Prenatal Results       Pregnancy Outside Results - Transcribed From Office Records - See Scanned Records For Details       Test Value Date Time    ABO  O  06/05/24 1152    Rh  Positive  06/05/24 1152    Antibody Screen  Negative  06/05/24 1152       Negative  05/21/24 1030       Negative  11/08/23 1107    Varicella IgG  2,433 index 11/08/23 1107    Rubella  2.96 index 11/08/23 1107    Hgb  7.7 g/dL 06/06/24 0550       8.6 g/dL " 06/05/24 1152       8.4 g/dL 06/04/24 0959       8.3 g/dL 05/21/24 1030       10.0 g/dL 03/14/24 0859       11.4 g/dL 01/03/24 1225       12.2 g/dL 11/08/23 1107    Hct  24.7 % 06/06/24 0550       26.9 % 06/05/24 1152       26.3 % 06/04/24 0959       26.4 % 05/21/24 1030       35.2 % 01/03/24 1225       37.4 % 11/08/23 1107    HgB A1c        1h GTT  91 mg/dL 03/14/24 0859    3h GTT Fasting       3h GTT 1 hour       3h GTT 2 hour       3h GTT 3 hour        Gonorrhea (discrete)  Negative  05/07/24 1024       Negative  11/08/23 1107    Chlamydia (discrete)  Negative  05/07/24 1024       Negative  11/08/23 1107    RPR  Non Reactive  03/14/24 0859       Non Reactive  11/08/23 1107    Syphilis Antibody       HBsAg  Negative  11/08/23 1107    Herpes Simplex Virus PCR       Herpes Simplex VIrus Culture       HIV  Non Reactive  11/08/23 1107    Hep C RNA Quant PCR       Hep C Antibody       AFP       NIPT       Cystic Fibroisis        Group B Strep  Negative  05/07/24 0820    GBS Susceptibility to Clindamycin       GBS Susceptibility to Erythromycin       Fetal Fibronectin       Genetic Testing, Maternal Blood                 Drug Screening       Test Value Date Time    Urine Drug Screen       Amphetamine Screen       Barbiturate Screen       Benzodiazepine Screen       Methadone Screen       Phencyclidine Screen       Opiates Screen       THC Screen       Cocaine Screen       Propoxyphene Screen       Buprenorphine Screen       Methamphetamine Screen       Oxycodone Screen       Tricyclic Antidepressants Screen                 Legend    ^: Historical                            Assessment & Plan       * No active hospital problems. *      Jessy Melchor is Day 2 post-partum  Vaginal, Spontaneous   .      Plan: Continue with current postpartum plan of care.  Outpatient lactation support as needed.  Discharge instructions provided.  Questions answered and understanding verbalized.  Return to the clinic in 2 weeks for blood  pressure check and as needed with concerns.      This note has been signed electronically.    Sailaja Ga DNP, APRN, CNM, RNC-OB  6/7/2024  07:44 CDT

## 2024-06-07 NOTE — PLAN OF CARE
Goal Outcome Evaluation:  Plan of Care Reviewed With: patient, spouse        Progress: improving  Outcome Evaluation: VSS, voiding, ambulating, breastfeeding prn pain meds used with relief, bonding well with infant

## 2024-06-17 ENCOUNTER — MATERNAL SCREENING (OUTPATIENT)
Dept: CALL CENTER | Facility: HOSPITAL | Age: 34
End: 2024-06-17
Payer: COMMERCIAL

## 2024-06-17 NOTE — OUTREACH NOTE
Maternal Screening Survey      Flowsheet Row Responses   Facility patient discharged from? [facility]   Attempt successful? Yes   Call start time    Call end time 113   Person spoke with today (if not patient) and relationship patient   EPD Scale: Able to Laugh 0-->as much as she always could   EPD Scale: Looked Forward 0-->as much as she ever did   EPD Scale: Blamed Self 0-->no, never   EPD Scale: Been Anxious 0-->no, not at all   EPD Scale: Felt Panicky 0-->no, not at all   EPD Scale: Things Getting on Top 0-->no, has been coping as well as ever   EPD Scale: Difficulty Sleeping 0-->no, not at all   EPD Scale: Sad or Miserable 0-->no, not at all   EPD Scale: Crying 0-->no, never   EPD Scale: Thought of Harming Self 0-->never   [place]  Depression Score 0   Did any of your parents have problems with alcohol or drug use? No   Do any of your peers have problems with alcohol or drug use? No   Does your partner have problems with alcohol or drug use? No   Before you were pregnant did you have problems with alcohol or drug use? (past) No   In the past month, did you drink beer, wine, liquor or use any other drugs? (pregnancy) No   Maternal Screening call completed Yes   Call end time 113              Gera RAMOS - Registered Nurse

## 2024-06-17 NOTE — OUTREACH NOTE
Maternal Screening Survey      Flowsheet Row Responses   Facility patient discharged from? [facility]   Attempt successful? No   Unsuccessful attempts Attempt 1              Gera RAMOS - Registered Nurse

## 2024-07-10 ENCOUNTER — POSTPARTUM VISIT (OUTPATIENT)
Age: 34
End: 2024-07-10
Payer: COMMERCIAL

## 2024-07-10 VITALS
WEIGHT: 149.9 LBS | SYSTOLIC BLOOD PRESSURE: 130 MMHG | BODY MASS INDEX: 27.58 KG/M2 | DIASTOLIC BLOOD PRESSURE: 86 MMHG | HEIGHT: 62 IN

## 2024-07-10 DIAGNOSIS — Z12.4 CERVICAL CANCER SCREENING: ICD-10-CM

## 2024-07-10 PROCEDURE — 87624 HPV HI-RISK TYP POOLED RSLT: CPT | Performed by: OBSTETRICS & GYNECOLOGY

## 2024-07-10 PROCEDURE — G0123 SCREEN CERV/VAG THIN LAYER: HCPCS | Performed by: OBSTETRICS & GYNECOLOGY

## 2024-07-10 NOTE — PROGRESS NOTES
"Chief Complaint  Postpartum Care (Pt here for PP visit, vaginal delivery 2024, no complications, last pap 2023, normal. Pt denies any problems today.)    Subjective        Jessy Melchor presents to [facility] for postpartum visit after . Pt doing well. Reports breastfeeding is going well. Declines contraception. Report normal bowel and bladder function. Denies vaginal discharge, pain, vaginal bleeding. Has not had a menses since delivery. Denies pp depression.   History of Present Illness    Objective   Vital Signs:  /86   Ht 157.5 cm (62.01\")   Wt 68 kg (149 lb 14.4 oz)   BMI 27.41 kg/m²   Estimated body mass index is 27.41 kg/m² as calculated from the following:    Height as of this encounter: 157.5 cm (62.01\").    Weight as of this encounter: 68 kg (149 lb 14.4 oz).       BMI is >= 25 and <30. (Overweight) The following options were offered after discussion;: exercise counseling/recommendations and nutrition counseling/recommendations      Physical Exam   Normal external genitalia, vaginal mucosa pink without lesions, no suture or granulation tissue, cervix 2x2 cm without lesions, pap smear done, bimanual NT/no masses  Result Review :                     Assessment and Plan     Diagnoses and all orders for this visit:    1. Postpartum care following vaginal delivery (Primary)  33 y/o CF s/p , doing well. Resume all normal activities. RTC yearly.    2. Cervical cancer screening  Pap smear today. Hx abnormal pap smear.            Follow Up     No follow-ups on file.  Patient was given instructions and counseling regarding her condition or for health maintenance advice. Please see specific information pulled into the AVS if appropriate.         "

## 2024-07-12 LAB
GEN CATEG CVX/VAG CYTO-IMP: NORMAL
HPV I/H RISK 4 DNA CVX QL PROBE+SIG AMP: NOT DETECTED
LAB AP CASE REPORT: NORMAL
LAB AP GYN ADDITIONAL INFORMATION: NORMAL
Lab: NORMAL
PATH INTERP SPEC-IMP: NORMAL
STAT OF ADQ CVX/VAG CYTO-IMP: NORMAL

## (undated) DEVICE — NITINOL STONE RETRIEVAL BASKET: Brand: ZERO TIP

## (undated) DEVICE — PK TURNOVER CYSTO RM

## (undated) DEVICE — GW PTFE FIX/CORE FLXTIP .038 3X150CM

## (undated) DEVICE — GLV SURG BIOGEL M LTX PF 7 1/2

## (undated) DEVICE — GW SENSR DUALFLEX NITNL STR .038IN 3X150CM

## (undated) DEVICE — FIBR LASR MOSES 365 DFL 6J 80HZ 120W

## (undated) DEVICE — PK CYSTO 30

## (undated) DEVICE — ENDOSCOPIC SEAL URO 1 SIZE FITS ALL: Brand: ENDOSCOPIC SEAL